# Patient Record
Sex: FEMALE | Race: WHITE | NOT HISPANIC OR LATINO | URBAN - METROPOLITAN AREA
[De-identification: names, ages, dates, MRNs, and addresses within clinical notes are randomized per-mention and may not be internally consistent; named-entity substitution may affect disease eponyms.]

---

## 2018-03-19 ENCOUNTER — OUTPATIENT (OUTPATIENT)
Dept: OUTPATIENT SERVICES | Facility: HOSPITAL | Age: 68
LOS: 1 days | End: 2018-03-19
Payer: MEDICARE

## 2018-03-19 ENCOUNTER — APPOINTMENT (OUTPATIENT)
Dept: NUCLEAR MEDICINE | Facility: HOSPITAL | Age: 68
End: 2018-03-19
Payer: MEDICARE

## 2018-03-19 DIAGNOSIS — C73 MALIGNANT NEOPLASM OF THYROID GLAND: ICD-10-CM

## 2018-03-19 PROCEDURE — 99201 OFFICE OUTPATIENT NEW 10 MINUTES: CPT

## 2018-03-20 ENCOUNTER — APPOINTMENT (OUTPATIENT)
Dept: NUCLEAR MEDICINE | Facility: HOSPITAL | Age: 68
End: 2018-03-20

## 2018-03-21 ENCOUNTER — APPOINTMENT (OUTPATIENT)
Dept: NUCLEAR MEDICINE | Facility: HOSPITAL | Age: 68
End: 2018-03-21

## 2018-03-23 ENCOUNTER — APPOINTMENT (OUTPATIENT)
Dept: NUCLEAR MEDICINE | Facility: HOSPITAL | Age: 68
End: 2018-03-23

## 2018-03-23 PROCEDURE — 78018 THYROID MET IMAGING BODY: CPT

## 2018-03-23 PROCEDURE — 78020 THYROID MET UPTAKE: CPT | Mod: 26

## 2018-03-23 PROCEDURE — 96372 THER/PROPH/DIAG INJ SC/IM: CPT

## 2018-03-23 PROCEDURE — 78020 THYROID MET UPTAKE: CPT

## 2018-03-23 PROCEDURE — 78018 THYROID MET IMAGING BODY: CPT | Mod: 26

## 2018-03-23 PROCEDURE — A9528: CPT

## 2018-06-18 ENCOUNTER — OUTPATIENT (OUTPATIENT)
Dept: OUTPATIENT SERVICES | Facility: HOSPITAL | Age: 68
LOS: 1 days | End: 2018-06-18
Payer: MEDICARE

## 2018-06-18 VITALS
SYSTOLIC BLOOD PRESSURE: 114 MMHG | HEIGHT: 65 IN | HEART RATE: 64 BPM | RESPIRATION RATE: 16 BRPM | DIASTOLIC BLOOD PRESSURE: 80 MMHG | TEMPERATURE: 99 F | WEIGHT: 166.23 LBS

## 2018-06-18 DIAGNOSIS — C73 MALIGNANT NEOPLASM OF THYROID GLAND: ICD-10-CM

## 2018-06-18 DIAGNOSIS — E89.0 POSTPROCEDURAL HYPOTHYROIDISM: Chronic | ICD-10-CM

## 2018-06-18 DIAGNOSIS — Z29.9 ENCOUNTER FOR PROPHYLACTIC MEASURES, UNSPECIFIED: ICD-10-CM

## 2018-06-18 DIAGNOSIS — Z01.818 ENCOUNTER FOR OTHER PREPROCEDURAL EXAMINATION: ICD-10-CM

## 2018-06-18 LAB
ALBUMIN SERPL ELPH-MCNC: 4.5 G/DL — SIGNIFICANT CHANGE UP (ref 3.3–5.2)
ALP SERPL-CCNC: 59 U/L — SIGNIFICANT CHANGE UP (ref 40–120)
ALT FLD-CCNC: 47 U/L — HIGH
ANION GAP SERPL CALC-SCNC: 13 MMOL/L — SIGNIFICANT CHANGE UP (ref 5–17)
APTT BLD: 30.8 SEC — SIGNIFICANT CHANGE UP (ref 27.5–37.4)
AST SERPL-CCNC: 51 U/L — HIGH
BILIRUB SERPL-MCNC: 1.8 MG/DL — SIGNIFICANT CHANGE UP (ref 0.4–2)
BLD GP AB SCN SERPL QL: SIGNIFICANT CHANGE UP
BUN SERPL-MCNC: 9 MG/DL — SIGNIFICANT CHANGE UP (ref 8–20)
CALCIUM SERPL-MCNC: 8.8 MG/DL — SIGNIFICANT CHANGE UP (ref 8.6–10.2)
CHLORIDE SERPL-SCNC: 98 MMOL/L — SIGNIFICANT CHANGE UP (ref 98–107)
CO2 SERPL-SCNC: 27 MMOL/L — SIGNIFICANT CHANGE UP (ref 22–29)
CREAT SERPL-MCNC: 0.6 MG/DL — SIGNIFICANT CHANGE UP (ref 0.5–1.3)
GLUCOSE SERPL-MCNC: 99 MG/DL — SIGNIFICANT CHANGE UP (ref 70–115)
HCT VFR BLD CALC: 39.6 % — SIGNIFICANT CHANGE UP (ref 37–47)
HGB BLD-MCNC: 12.9 G/DL — SIGNIFICANT CHANGE UP (ref 12–16)
INR BLD: 1.15 RATIO — SIGNIFICANT CHANGE UP (ref 0.88–1.16)
MCHC RBC-ENTMCNC: 32.6 G/DL — SIGNIFICANT CHANGE UP (ref 32–36)
MCHC RBC-ENTMCNC: 32.9 PG — HIGH (ref 27–31)
MCV RBC AUTO: 101 FL — HIGH (ref 81–99)
PLATELET # BLD AUTO: 243 K/UL — SIGNIFICANT CHANGE UP (ref 150–400)
POTASSIUM SERPL-MCNC: 3.8 MMOL/L — SIGNIFICANT CHANGE UP (ref 3.5–5.3)
POTASSIUM SERPL-SCNC: 3.8 MMOL/L — SIGNIFICANT CHANGE UP (ref 3.5–5.3)
PROT SERPL-MCNC: 7.6 G/DL — SIGNIFICANT CHANGE UP (ref 6.6–8.7)
PROTHROM AB SERPL-ACNC: 12.7 SEC — SIGNIFICANT CHANGE UP (ref 9.8–12.7)
RBC # BLD: 3.92 M/UL — LOW (ref 4.4–5.2)
RBC # FLD: 12.3 % — SIGNIFICANT CHANGE UP (ref 11–15.6)
SODIUM SERPL-SCNC: 138 MMOL/L — SIGNIFICANT CHANGE UP (ref 135–145)
TYPE + AB SCN PNL BLD: SIGNIFICANT CHANGE UP
WBC # BLD: 8 K/UL — SIGNIFICANT CHANGE UP (ref 4.8–10.8)
WBC # FLD AUTO: 8 K/UL — SIGNIFICANT CHANGE UP (ref 4.8–10.8)

## 2018-06-18 PROCEDURE — G0463: CPT

## 2018-06-18 PROCEDURE — 86900 BLOOD TYPING SEROLOGIC ABO: CPT

## 2018-06-18 PROCEDURE — 93010 ELECTROCARDIOGRAM REPORT: CPT

## 2018-06-18 PROCEDURE — 85730 THROMBOPLASTIN TIME PARTIAL: CPT

## 2018-06-18 PROCEDURE — 86850 RBC ANTIBODY SCREEN: CPT

## 2018-06-18 PROCEDURE — 85027 COMPLETE CBC AUTOMATED: CPT

## 2018-06-18 PROCEDURE — 36415 COLL VENOUS BLD VENIPUNCTURE: CPT

## 2018-06-18 PROCEDURE — 80053 COMPREHEN METABOLIC PANEL: CPT

## 2018-06-18 PROCEDURE — 93005 ELECTROCARDIOGRAM TRACING: CPT

## 2018-06-18 PROCEDURE — 71046 X-RAY EXAM CHEST 2 VIEWS: CPT | Mod: 26

## 2018-06-18 PROCEDURE — 85610 PROTHROMBIN TIME: CPT

## 2018-06-18 PROCEDURE — 86901 BLOOD TYPING SEROLOGIC RH(D): CPT

## 2018-06-18 PROCEDURE — 71046 X-RAY EXAM CHEST 2 VIEWS: CPT

## 2018-06-18 NOTE — H&P PST ADULT - FAMILY HISTORY
Father  Still living? No  Family history of COPD (chronic obstructive pulmonary disease), Age at diagnosis: Age Unknown     Mother  Still living? No  Family history of brain cancer, Age at diagnosis: Age Unknown

## 2018-06-18 NOTE — H&P PST ADULT - NEGATIVE SKIN SYMPTOMS
no change in size/color of mole/no pitted nails/no rash/no tumor/no brittle nails/no hair loss/no itching/no dryness

## 2018-06-18 NOTE — H&P PST ADULT - NEGATIVE CARDIOVASCULAR SYMPTOMS
no claudication/no palpitations/no dyspnea on exertion/no peripheral edema/no chest pain/no paroxysmal nocturnal dyspnea/no orthopnea

## 2018-06-18 NOTE — H&P PST ADULT - NEGATIVE GENERAL SYMPTOMS
no chills/no sweating/no anorexia/no weight gain/no polyphagia/no polyuria/no fatigue/no weight loss/no polydipsia/no fever/no malaise

## 2018-06-18 NOTE — H&P PST ADULT - NSANTHOSAYNRD_GEN_A_CORE
No. HO screening performed.  STOP BANG Legend: 0-2 = LOW Risk; 3-4 = INTERMEDIATE Risk; 5-8 = HIGH Risk

## 2018-06-18 NOTE — H&P PST ADULT - NEGATIVE FEMALE-SPECIFIC SYMPTOMS
no menorrhagia/no irregular menses/no spotting/no amenorrhea/not applicable/no vaginal discharge/no dysmenorrhea/no abnormal vaginal bleeding/no pelvic pain

## 2018-06-18 NOTE — H&P PST ADULT - NEGATIVE PSYCHIATRIC SYMPTOMS
no auditory hallucinations/no insomnia/no visual hallucinations/no memory loss/no mood swings/no agitation/no hyperactivity/no depression/no anxiety/no suicidal ideation/no paranoia

## 2018-06-18 NOTE — H&P PST ADULT - RS GEN PE MLT RESP DETAILS PC
no rhonchi/no wheezes/respirations non-labored/no chest wall tenderness/no rales/clear to auscultation bilaterally/no subcutaneous emphysema/no intercostal retractions/normal/airway patent/breath sounds equal/good air movement

## 2018-06-18 NOTE — H&P PST ADULT - NEGATIVE GASTROINTESTINAL SYMPTOMS
no steatorrhea/no nausea/no melena/no jaundice/no diarrhea/no flatulence/no vomiting/no constipation/no change in bowel habits/no abdominal pain/no hematochezia/no hiccoughs

## 2018-06-18 NOTE — H&P PST ADULT - NEGATIVE OPHTHALMOLOGIC SYMPTOMS
no pain R/no irritation R/no loss of vision L/no scleral injection L/no diplopia/no discharge R/no pain L/no scleral injection R/no lacrimation L/no lacrimation R/no blurred vision L/no blurred vision R/no irritation L/no loss of vision R/no photophobia/no discharge L

## 2018-06-18 NOTE — H&P PST ADULT - NEGATIVE ENMT SYMPTOMS
no dysphagia/no recurrent cold sores/no nasal obstruction/no post-nasal discharge/no nose bleeds/no abnormal taste sensation/no dry mouth/no nasal discharge/no vertigo/no sinus symptoms/no tinnitus/no hearing difficulty/no nasal congestion/no gum bleeding/no throat pain/no ear pain

## 2018-06-18 NOTE — H&P PST ADULT - HISTORY OF PRESENT ILLNESS
66 y/o female s/p thyroidectomy for thyroid cancer now had follow up ct of the head and neck which showed nodules patient now presents for excision of nodules left neck frozen section possible modified radical neck dissection

## 2018-06-18 NOTE — H&P PST ADULT - NEUROLOGICAL DETAILS
normal strength/responds to pain/sensation intact/cranial nerves intact/no spontaneous movement/superficial reflexes intact/alert and oriented x 3/responds to verbal commands/deep reflexes intact

## 2018-06-18 NOTE — H&P PST ADULT - GASTROINTESTINAL DETAILS
nontender/no rigidity/no organomegaly/soft/no distention/no bruit/normal/no masses palpable/no rebound tenderness/no guarding/bowel sounds normal

## 2018-06-18 NOTE — H&P PST ADULT - NEGATIVE MUSCULOSKELETAL SYMPTOMS
no myalgia/no stiffness/no neck pain/no arm pain R/no leg pain R/no muscle weakness/no back pain/no leg pain L/no muscle cramps/no arthritis/no joint swelling/no arthralgia/no arm pain L

## 2018-06-18 NOTE — H&P PST ADULT - NEGATIVE NEUROLOGICAL SYMPTOMS
no difficulty walking/no transient paralysis/no weakness/no confusion/no loss of consciousness/no hemiparesis/no tremors/no vertigo/no loss of sensation/no facial palsy/no paresthesias/no syncope/no headache/no generalized seizures/no focal seizures

## 2018-06-18 NOTE — H&P PST ADULT - ASSESSMENT
Thyroid nodule  CAPRINI SCORE    AGE RELATED RISK FACTORS                                                       MOBILITY RELATED FACTORS  [ ] Age 41-60 years                                            (1 Point)                  [ ] Bed rest                                                        (1 Point)  [x ] Age: 61-74 years                                           (2 Points)                [ ] Plaster cast                                                   (2 Points)  [ ] Age= 75 years                                              (3 Points)                 [ ] Bed bound for more than 72 hours                   (2 Points)    DISEASE RELATED RISK FACTORS                                               GENDER SPECIFIC FACTORS  [ ] Edema in the lower extremities                       (1 Point)                  [ ] Pregnancy                                                     (1 Point)  [ ] Varicose veins                                               (1 Point)                  [ ] Post-partum < 6 weeks                                   (1 Point)             [x ] BMI > 25 Kg/m2                                            (1 Point)                  [ ] Hormonal therapy  or oral contraception            (1 Point)                 [ ] Sepsis (in the previous month)                        (1 Point)                  [ ] History of pregnancy complications  [ ] Pneumonia or serious lung disease                                               [ ] Unexplained or recurrent                       (1 Point)           (in the previous month)                               (1 Point)  [ ] Abnormal pulmonary function test                     (1 Point)                 SURGERY RELATED RISK FACTORS  [ ] Acute myocardial infarction                              (1 Point)                 [ ]  Section                                            (1 Point)  [ ] Congestive heart failure (in the previous month)  (1 Point)                 [ ] Minor surgery                                                 (1 Point)   [ ] Inflammatory bowel disease                             (1 Point)                 [ ] Arthroscopic surgery                                        (2 Points)  [ ] Central venous access                                    (2 Points)                [ x] General surgery lasting more than 45 minutes   (2 Points)       [ ] Stroke (in the previous month)                          (5 Points)               [ ] Elective arthroplasty                                        (5 Points)                                                                                                                                               HEMATOLOGY RELATED FACTORS                                                 TRAUMA RELATED RISK FACTORS  [ ] Prior episodes of VTE                                     (3 Points)                 [ ] Fracture of the hip, pelvis, or leg                       (5 Points)  [ ] Positive family history for VTE                         (3 Points)                 [ ] Acute spinal cord injury (in the previous month)  (5 Points)  [ ] Prothrombin 30264 A                                      (3 Points)                 [ ] Paralysis  (less than 1 month)                          (5 Points)  [ ] Factor V Leiden                                             (3 Points)                 [ ] Multiple Trauma within 1 month                         (5 Points)  [ ] Lupus anticoagulants                                     (3 Points)                                                           [ ] Anticardiolipin antibodies                                (3 Points)                                                       [ ] High homocysteine in the blood                      (3 Points)                                             [ ] Other congenital or acquired thrombophilia       (3 Points)                                                [ ] Heparin induced thrombocytopenia                  (3 Points)                                          Total Score [  5  ]

## 2018-06-18 NOTE — H&P PST ADULT - NEGATIVE GENERAL GENITOURINARY SYMPTOMS
no incontinence/no nocturia/no urine discoloration/no bladder infections/no hematuria/no flank pain L/no flank pain R/no gas in urine/no dysuria/normal urinary frequency/no urinary hesitancy/no renal colic

## 2018-06-18 NOTE — H&P PST ADULT - MUSCULOSKELETAL
details… detailed exam no joint warmth/normal strength/no joint swelling/no joint erythema/no calf tenderness/ROM intact/normal

## 2018-06-19 ENCOUNTER — OUTPATIENT (OUTPATIENT)
Dept: OUTPATIENT SERVICES | Facility: HOSPITAL | Age: 68
LOS: 1 days | End: 2018-06-19

## 2018-06-19 DIAGNOSIS — Z01.812 ENCOUNTER FOR PREPROCEDURAL LABORATORY EXAMINATION: ICD-10-CM

## 2018-06-19 DIAGNOSIS — E89.0 POSTPROCEDURAL HYPOTHYROIDISM: Chronic | ICD-10-CM

## 2018-06-19 LAB
APPEARANCE UR: CLEAR — SIGNIFICANT CHANGE UP
BILIRUB UR-MCNC: NEGATIVE — SIGNIFICANT CHANGE UP
COLOR SPEC: YELLOW — SIGNIFICANT CHANGE UP
DIFF PNL FLD: ABNORMAL
EPI CELLS # UR: ABNORMAL
GLUCOSE UR QL: NEGATIVE MG/DL — SIGNIFICANT CHANGE UP
KETONES UR-MCNC: NEGATIVE — SIGNIFICANT CHANGE UP
LEUKOCYTE ESTERASE UR-ACNC: ABNORMAL
NITRITE UR-MCNC: NEGATIVE — SIGNIFICANT CHANGE UP
PH UR: 7 — SIGNIFICANT CHANGE UP (ref 5–8)
PROT UR-MCNC: NEGATIVE MG/DL — SIGNIFICANT CHANGE UP
SP GR SPEC: 1 — LOW (ref 1.01–1.02)
UROBILINOGEN FLD QL: NEGATIVE MG/DL — SIGNIFICANT CHANGE UP
WBC UR QL: SIGNIFICANT CHANGE UP

## 2018-06-20 LAB
THYROGLOB AB FLD IA-ACNC: <20 IU/ML — SIGNIFICANT CHANGE UP (ref 0–40)
THYROGLOB AB SERPL-ACNC: <20 IU/ML — SIGNIFICANT CHANGE UP (ref 0–40)
THYROGLOB SERPL-MCNC: 32.1 NG/ML — SIGNIFICANT CHANGE UP (ref 1.6–59.9)
THYROPEROXIDASE AB SERPL-ACNC: <10 IU/ML — SIGNIFICANT CHANGE UP (ref 0–34.9)

## 2018-06-21 ENCOUNTER — TRANSCRIPTION ENCOUNTER (OUTPATIENT)
Age: 68
End: 2018-06-21

## 2018-06-22 ENCOUNTER — RESULT REVIEW (OUTPATIENT)
Age: 68
End: 2018-06-22

## 2018-06-22 ENCOUNTER — INPATIENT (INPATIENT)
Facility: HOSPITAL | Age: 68
LOS: 0 days | Discharge: HOME | DRG: 627 | End: 2018-06-23
Attending: SPECIALIST | Admitting: SPECIALIST
Payer: MEDICARE

## 2018-06-22 VITALS
HEART RATE: 79 BPM | WEIGHT: 166.23 LBS | SYSTOLIC BLOOD PRESSURE: 147 MMHG | TEMPERATURE: 98 F | DIASTOLIC BLOOD PRESSURE: 95 MMHG | RESPIRATION RATE: 16 BRPM | OXYGEN SATURATION: 100 % | HEIGHT: 65 IN

## 2018-06-22 DIAGNOSIS — C73 MALIGNANT NEOPLASM OF THYROID GLAND: ICD-10-CM

## 2018-06-22 DIAGNOSIS — E89.0 POSTPROCEDURAL HYPOTHYROIDISM: Chronic | ICD-10-CM

## 2018-06-22 LAB — CALCIUM SERPL-MCNC: 8.6 MG/DL — SIGNIFICANT CHANGE UP (ref 8.6–10.2)

## 2018-06-22 PROCEDURE — 88341 IMHCHEM/IMCYTCHM EA ADD ANTB: CPT | Mod: 26

## 2018-06-22 PROCEDURE — 60260 REPEAT THYROID SURGERY: CPT | Mod: AS

## 2018-06-22 PROCEDURE — 88305 TISSUE EXAM BY PATHOLOGIST: CPT | Mod: 26

## 2018-06-22 PROCEDURE — 88331 PATH CONSLTJ SURG 1 BLK 1SPC: CPT | Mod: 26

## 2018-06-22 PROCEDURE — 38542 EXPLORE DEEP NODE(S) NECK: CPT | Mod: AS,59

## 2018-06-22 PROCEDURE — 60502 RE-EXPLORE PARATHYROIDS: CPT | Mod: AS,79

## 2018-06-22 PROCEDURE — 88342 IMHCHEM/IMCYTCHM 1ST ANTB: CPT | Mod: 26

## 2018-06-22 RX ORDER — OXYCODONE AND ACETAMINOPHEN 5; 325 MG/1; MG/1
1 TABLET ORAL EVERY 4 HOURS
Qty: 0 | Refills: 0 | Status: DISCONTINUED | OUTPATIENT
Start: 2018-06-22 | End: 2018-06-23

## 2018-06-22 RX ORDER — LEVOTHYROXINE SODIUM 125 MCG
100 TABLET ORAL DAILY
Qty: 0 | Refills: 0 | Status: DISCONTINUED | OUTPATIENT
Start: 2018-06-22 | End: 2018-06-23

## 2018-06-22 RX ORDER — SODIUM CHLORIDE 9 MG/ML
3 INJECTION INTRAMUSCULAR; INTRAVENOUS; SUBCUTANEOUS ONCE
Qty: 0 | Refills: 0 | Status: COMPLETED | OUTPATIENT
Start: 2018-06-22 | End: 2018-06-22

## 2018-06-22 RX ORDER — HYDROCHLOROTHIAZIDE 25 MG
12.5 TABLET ORAL DAILY
Qty: 0 | Refills: 0 | Status: DISCONTINUED | OUTPATIENT
Start: 2018-06-22 | End: 2018-06-23

## 2018-06-22 RX ORDER — HYDROMORPHONE HYDROCHLORIDE 2 MG/ML
0.5 INJECTION INTRAMUSCULAR; INTRAVENOUS; SUBCUTANEOUS
Qty: 0 | Refills: 0 | Status: DISCONTINUED | OUTPATIENT
Start: 2018-06-22 | End: 2018-06-22

## 2018-06-22 RX ORDER — LOSARTAN POTASSIUM 100 MG/1
100 TABLET, FILM COATED ORAL DAILY
Qty: 0 | Refills: 0 | Status: DISCONTINUED | OUTPATIENT
Start: 2018-06-22 | End: 2018-06-23

## 2018-06-22 RX ORDER — ATORVASTATIN CALCIUM 80 MG/1
20 TABLET, FILM COATED ORAL AT BEDTIME
Qty: 0 | Refills: 0 | Status: DISCONTINUED | OUTPATIENT
Start: 2018-06-22 | End: 2018-06-23

## 2018-06-22 RX ORDER — ONDANSETRON 8 MG/1
4 TABLET, FILM COATED ORAL ONCE
Qty: 0 | Refills: 0 | Status: DISCONTINUED | OUTPATIENT
Start: 2018-06-22 | End: 2018-06-22

## 2018-06-22 RX ORDER — CEFAZOLIN SODIUM 1 G
2000 VIAL (EA) INJECTION ONCE
Qty: 0 | Refills: 0 | Status: COMPLETED | OUTPATIENT
Start: 2018-06-22 | End: 2018-06-22

## 2018-06-22 RX ORDER — FENTANYL CITRATE 50 UG/ML
25 INJECTION INTRAVENOUS
Qty: 0 | Refills: 0 | Status: DISCONTINUED | OUTPATIENT
Start: 2018-06-22 | End: 2018-06-22

## 2018-06-22 RX ADMIN — HYDROMORPHONE HYDROCHLORIDE 0.5 MILLIGRAM(S): 2 INJECTION INTRAMUSCULAR; INTRAVENOUS; SUBCUTANEOUS at 18:15

## 2018-06-22 RX ADMIN — HYDROMORPHONE HYDROCHLORIDE 0.5 MILLIGRAM(S): 2 INJECTION INTRAMUSCULAR; INTRAVENOUS; SUBCUTANEOUS at 18:00

## 2018-06-22 RX ADMIN — OXYCODONE AND ACETAMINOPHEN 1 TABLET(S): 5; 325 TABLET ORAL at 21:30

## 2018-06-22 RX ADMIN — OXYCODONE AND ACETAMINOPHEN 1 TABLET(S): 5; 325 TABLET ORAL at 21:56

## 2018-06-22 RX ADMIN — Medication 100 MILLIGRAM(S): at 14:50

## 2018-06-22 RX ADMIN — SODIUM CHLORIDE 3 MILLILITER(S): 9 INJECTION INTRAMUSCULAR; INTRAVENOUS; SUBCUTANEOUS at 21:20

## 2018-06-22 RX ADMIN — ATORVASTATIN CALCIUM 20 MILLIGRAM(S): 80 TABLET, FILM COATED ORAL at 21:56

## 2018-06-22 RX ADMIN — HYDROMORPHONE HYDROCHLORIDE 0.5 MILLIGRAM(S): 2 INJECTION INTRAMUSCULAR; INTRAVENOUS; SUBCUTANEOUS at 18:01

## 2018-06-22 RX ADMIN — HYDROMORPHONE HYDROCHLORIDE 0.5 MILLIGRAM(S): 2 INJECTION INTRAMUSCULAR; INTRAVENOUS; SUBCUTANEOUS at 18:30

## 2018-06-22 NOTE — BRIEF OPERATIVE NOTE - OPERATION/FINDINGS
L neck exploration  excision nodules L thyroid bed  excision LN jugular medial  excision LN level 3 jugular lateral  IONM  PC

## 2018-06-22 NOTE — PROGRESS NOTE ADULT - SUBJECTIVE AND OBJECTIVE BOX
POD#0 s/p L neck exploration, excision nodules L thyroid bed, excision LN jugular medial, excision LN level 3 jugular lateral, IONM, PC    Pt denies complaints, but is requesting pain meds.   Denies difficulty w/swallowing, speaking, breathing.     VSS, NAD, A&Ox3  Neck : surgical towel around neck, dsg cdi , no palpable hematoma   CVS: S1, S2  Chest: BS BL   Abd: NT   Ext: no CT       post op CA = 8.6

## 2018-06-23 ENCOUNTER — TRANSCRIPTION ENCOUNTER (OUTPATIENT)
Age: 68
End: 2018-06-23

## 2018-06-23 VITALS — TEMPERATURE: 99 F

## 2018-06-23 DIAGNOSIS — Z85.850 PERSONAL HISTORY OF MALIGNANT NEOPLASM OF THYROID: ICD-10-CM

## 2018-06-23 LAB
ANION GAP SERPL CALC-SCNC: 13 MMOL/L — SIGNIFICANT CHANGE UP (ref 5–17)
BUN SERPL-MCNC: 11 MG/DL — SIGNIFICANT CHANGE UP (ref 8–20)
CALCIUM SERPL-MCNC: 8.4 MG/DL — LOW (ref 8.6–10.2)
CHLORIDE SERPL-SCNC: 100 MMOL/L — SIGNIFICANT CHANGE UP (ref 98–107)
CO2 SERPL-SCNC: 25 MMOL/L — SIGNIFICANT CHANGE UP (ref 22–29)
CREAT SERPL-MCNC: 0.57 MG/DL — SIGNIFICANT CHANGE UP (ref 0.5–1.3)
GLUCOSE SERPL-MCNC: 114 MG/DL — SIGNIFICANT CHANGE UP (ref 70–115)
HCT VFR BLD CALC: 34.9 % — LOW (ref 37–47)
HGB BLD-MCNC: 11.7 G/DL — LOW (ref 12–16)
MCHC RBC-ENTMCNC: 33.5 G/DL — SIGNIFICANT CHANGE UP (ref 32–36)
MCHC RBC-ENTMCNC: 34.4 PG — HIGH (ref 27–31)
MCV RBC AUTO: 102.6 FL — HIGH (ref 81–99)
PLATELET # BLD AUTO: 213 K/UL — SIGNIFICANT CHANGE UP (ref 150–400)
POTASSIUM SERPL-MCNC: 3.6 MMOL/L — SIGNIFICANT CHANGE UP (ref 3.5–5.3)
POTASSIUM SERPL-SCNC: 3.6 MMOL/L — SIGNIFICANT CHANGE UP (ref 3.5–5.3)
RBC # BLD: 3.4 M/UL — LOW (ref 4.4–5.2)
RBC # FLD: 11.9 % — SIGNIFICANT CHANGE UP (ref 11–15.6)
SODIUM SERPL-SCNC: 138 MMOL/L — SIGNIFICANT CHANGE UP (ref 135–145)
WBC # BLD: 8.9 K/UL — SIGNIFICANT CHANGE UP (ref 4.8–10.8)
WBC # FLD AUTO: 8.9 K/UL — SIGNIFICANT CHANGE UP (ref 4.8–10.8)

## 2018-06-23 PROCEDURE — 88331 PATH CONSLTJ SURG 1 BLK 1SPC: CPT

## 2018-06-23 PROCEDURE — 81001 URINALYSIS AUTO W/SCOPE: CPT

## 2018-06-23 PROCEDURE — 88305 TISSUE EXAM BY PATHOLOGIST: CPT

## 2018-06-23 PROCEDURE — 84432 ASSAY OF THYROGLOBULIN: CPT

## 2018-06-23 PROCEDURE — 88341 IMHCHEM/IMCYTCHM EA ADD ANTB: CPT

## 2018-06-23 PROCEDURE — 88342 IMHCHEM/IMCYTCHM 1ST ANTB: CPT

## 2018-06-23 PROCEDURE — 86900 BLOOD TYPING SEROLOGIC ABO: CPT

## 2018-06-23 PROCEDURE — 86800 THYROGLOBULIN ANTIBODY: CPT

## 2018-06-23 PROCEDURE — 36415 COLL VENOUS BLD VENIPUNCTURE: CPT

## 2018-06-23 PROCEDURE — 86901 BLOOD TYPING SEROLOGIC RH(D): CPT

## 2018-06-23 PROCEDURE — 82310 ASSAY OF CALCIUM: CPT

## 2018-06-23 PROCEDURE — 85027 COMPLETE CBC AUTOMATED: CPT

## 2018-06-23 PROCEDURE — 80048 BASIC METABOLIC PNL TOTAL CA: CPT

## 2018-06-23 RX ORDER — LOSARTAN/HYDROCHLOROTHIAZIDE 100MG-25MG
1 TABLET ORAL
Qty: 0 | Refills: 0 | COMMUNITY

## 2018-06-23 RX ADMIN — OXYCODONE AND ACETAMINOPHEN 1 TABLET(S): 5; 325 TABLET ORAL at 04:30

## 2018-06-23 RX ADMIN — OXYCODONE AND ACETAMINOPHEN 1 TABLET(S): 5; 325 TABLET ORAL at 03:53

## 2018-06-23 RX ADMIN — OXYCODONE AND ACETAMINOPHEN 1 TABLET(S): 5; 325 TABLET ORAL at 11:15

## 2018-06-23 RX ADMIN — Medication 100 MICROGRAM(S): at 06:13

## 2018-06-23 RX ADMIN — OXYCODONE AND ACETAMINOPHEN 1 TABLET(S): 5; 325 TABLET ORAL at 10:37

## 2018-06-23 NOTE — DISCHARGE NOTE ADULT - CONDITIONS AT DISCHARGE
Pt remained hemodynamically stable. Pt denied chest pain and shortness of breath. Pt remained safe, calm, and comfortable. Pt incision clean, dry, and intact. No discharge or drainage at site. Vital signs stable. Patient ready for discharge.

## 2018-06-23 NOTE — DISCHARGE NOTE ADULT - NS AS ACTIVITY OBS
Walking-Outdoors allowed/Stairs allowed/Do not drive or operate machinery/Do not make important decisions/Walking-Indoors allowed/No Heavy lifting/straining

## 2018-06-23 NOTE — DISCHARGE NOTE ADULT - MEDICATION SUMMARY - MEDICATIONS TO TAKE
I will START or STAY ON the medications listed below when I get home from the hospital:    oxyCODONE-acetaminophen 5 mg-325 mg oral tablet  -- 1 tab(s) by mouth every 6 hours, As Needed -Moderate Pain (4 - 6) MDD:4 tabs  -- Indication: For pain    Lipitor 20 mg oral tablet  -- 1 tab(s) by mouth once a day (at bedtime)  -- Indication: For Home med    losartan-hydrochlorothiazide 100mg-12.5mg oral tablet  -- 1 tab(s) by mouth once a day  -- Indication: For Home med    Synthroid 100 mcg (0.1 mg) oral tablet  -- 1 tab(s) by mouth once a day  -- Indication: For Home med

## 2018-06-23 NOTE — DISCHARGE NOTE ADULT - CARE PROVIDER_API CALL
Ney Barajas), Surgery  [address]  [address]  [place] [address]  Phone: [phone]  Fax: [phone]

## 2018-06-23 NOTE — DISCHARGE NOTE ADULT - PATIENT PORTAL LINK FT
You can access the [facility], offered by [facility], by registering with the following website: [email or web address]

## 2018-06-23 NOTE — DISCHARGE NOTE ADULT - CARE PLAN
Principal Discharge DX:	History of thyroid cancer  Goal:	resume diet and activities of daily living  Assessment and plan of treatment:	stable, follow up with Dr Barajas  6/28

## 2018-06-23 NOTE — PROGRESS NOTE ADULT - SUBJECTIVE AND OBJECTIVE BOX
SURGICAL PA NOTE:     STATUS POST:      Diagnosis:    Pre-Op Diagnosis:  Recurrent thyroid cancer  06/22/2018    Active  Ney Barajas.     Post-Op Dx:  Recurrent thyroid cancer  06/22/2018    Active  Ney Barajas.       Operative Findings:  · Operative Findings	L neck exploration excision nodules L thyroid bed excision LN jugular medial excision LN level 3 jugular lateral IONM PC	    Specimens/Blood Loss/IV/Output/Protocol/VTE:    Specimens/Blood Loss/IV/Output/Protocol/VTE:  · Specimens	3	  · Estimated Blood Loss	10 milliLiter(s)	      POST OPERATIVE DAY #: 1    Vital Signs Last 24 Hrs  T(C): 36.5 (23 Jun 2018 04:00), Max: 37.1 (22 Jun 2018 21:00)  T(F): 97.7 (23 Jun 2018 04:00), Max: 98.7 (22 Jun 2018 21:00)  HR: 52 (23 Jun 2018 06:00) (52 - 656)  BP: 104/57 (23 Jun 2018 06:00) (89/55 - 147/95)  BP(mean): 75 (23 Jun 2018 06:00) (66 - 95)  RR: 15 (23 Jun 2018 06:00) (10 - 28)  SpO2: 92% (23 Jun 2018 06:00) (91% - 100%)    HPI:      Malignant neoplasm of thyroid gland  Family history of brain cancer  Family history of COPD (chronic obstructive pulmonary disease)  Handoff  Hypothyroid  History of thyroid cancer  H/O heartburn  Hyperlipidemia  Hypertension  Recurrent thyroid cancer  Recurrent thyroid cancer  H/O thyroidectomy      SUBJECTIVE: Pt seen lying supine with HOB, no c/o SOB/dyspnea, good voice projection, barbara po liquids ok, no difficulty swallowing, c/o mild incisional pain    Diet: clears to reg today    Activity: OOB    Fevers: [ ]Yes [x ]NO  Chills: [ ] Yes [x ] NO  SOB:  [ ] YES [x ] NO  Dyspnea: [ ]YES [ x]NO  Chest Discomfort: [ ] YES [x ] NO    Nausea: [ ] YES [x ] NO           Vomiting: [ ] YES [ x] NO  Flatus: [ x] YES [ ] NO             Bowel Movement: [ ] YES [ ] NO  Diarrhea: [ ] YES [ ] NO         Void: [x ]YES [ ]No  Constipation: [ ] YES [x ] NO       Pain (0-10):              Pain Control Adequate: [x ] YES [ ] NO    Root:    NGT:      I&O's Detail    22 Jun 2018 07:01  -  23 Jun 2018 07:00  --------------------------------------------------------  IN:    Oral Fluid: 600 mL  Total IN: 600 mL    OUT:    Bulb: 60 mL    Voided: 400 mL  Total OUT: 460 mL    Total NET: 140 mL        I&O's Summary    22 Jun 2018 07:01  -  23 Jun 2018 07:00  --------------------------------------------------------  IN: 600 mL / OUT: 460 mL / NET: 140 mL          MEDICATIONS  (STANDING):  atorvastatin 20 milliGRAM(s) Oral at bedtime  hydrochlorothiazide 12.5 milliGRAM(s) Oral daily  levothyroxine 100 MICROGram(s) Oral daily  losartan 100 milliGRAM(s) Oral daily    MEDICATIONS  (PRN):  oxyCODONE    5 mG/acetaminophen 325 mG 1 Tablet(s) Oral every 4 hours PRN Moderate Pain (4 - 6)      LABS:                        11.7   8.9   )-----------( 213      ( 23 Jun 2018 04:58 )             34.9     06-23    138  |  100  |  11.0  ----------------------------<  114  3.6   |  25.0  |  0.57    Ca    8.4<L>      23 Jun 2018 04:58              RADIOLOGY & ADDITIONAL STUDIES: SURGICAL PA NOTE:     STATUS POST:      Diagnosis:    Pre-Op Diagnosis:  Recurrent thyroid cancer  06/22/2018    Active  Ney Barajas.     Post-Op Dx:  Recurrent thyroid cancer  06/22/2018    Active  Ney Barajas.       Operative Findings:  · Operative Findings	L neck exploration excision nodules L thyroid bed excision LN jugular medial excision LN level 3 jugular lateral IONM PC	    Specimens/Blood Loss/IV/Output/Protocol/VTE:    Specimens/Blood Loss/IV/Output/Protocol/VTE:  · Specimens	3	  · Estimated Blood Loss	10 milliLiter(s)	      POST OPERATIVE DAY #: 1    Vital Signs Last 24 Hrs  T(C): 36.5 (23 Jun 2018 04:00), Max: 37.1 (22 Jun 2018 21:00)  T(F): 97.7 (23 Jun 2018 04:00), Max: 98.7 (22 Jun 2018 21:00)  HR: 52 (23 Jun 2018 06:00) (52 - 656)  BP: 104/57 (23 Jun 2018 06:00) (89/55 - 147/95)  BP(mean): 75 (23 Jun 2018 06:00) (66 - 95)  RR: 15 (23 Jun 2018 06:00) (10 - 28)  SpO2: 92% (23 Jun 2018 06:00) (91% - 100%)    HPI:      Malignant neoplasm of thyroid gland  Family history of brain cancer  Family history of COPD (chronic obstructive pulmonary disease)  Handoff  Hypothyroid  History of thyroid cancer  H/O heartburn  Hyperlipidemia  Hypertension  Recurrent thyroid cancer  Recurrent thyroid cancer  H/O thyroidectomy      SUBJECTIVE: Pt seen lying supine with HOB, no c/o SOB/dyspnea, good voice projection, barbara po liquids ok, no difficulty swallowing, c/o mild incisional pain    Diet: clears to reg today    Activity: OOB    Fevers: [ ]Yes [x ]NO  Chills: [ ] Yes [x ] NO  SOB:  [ ] YES [x ] NO  Dyspnea: [ ]YES [ x]NO  Chest Discomfort: [ ] YES [x ] NO    Nausea: [ ] YES [x ] NO           Vomiting: [ ] YES [ x] NO  Flatus: [ x] YES [ ] NO             Bowel Movement: [ ] YES [ ] NO  Diarrhea: [ ] YES [ ] NO         Void: [x ]YES [ ]No  Constipation: [ ] YES [x ] NO       Pain (0-10):              Pain Control Adequate: [x ] YES [ ] NO    Root:    NGT:      I&O's Detail    22 Jun 2018 07:01  -  23 Jun 2018 07:00  --------------------------------------------------------  IN:    Oral Fluid: 600 mL  Total IN: 600 mL    OUT:    Bulb: 30 mL    Voided: 400 mL  Total OUT: 460 mL    Total NET: 140 mL        I&O's Summary    22 Jun 2018 07:01  -  23 Jun 2018 07:00  --------------------------------------------------------  IN: 600 mL / OUT: 460 mL / NET: 140 mL          MEDICATIONS  (STANDING):  atorvastatin 20 milliGRAM(s) Oral at bedtime  hydrochlorothiazide 12.5 milliGRAM(s) Oral daily  levothyroxine 100 MICROGram(s) Oral daily  losartan 100 milliGRAM(s) Oral daily    MEDICATIONS  (PRN):  oxyCODONE    5 mG/acetaminophen 325 mG 1 Tablet(s) Oral every 4 hours PRN Moderate Pain (4 - 6)      LABS:                        11.7   8.9   )-----------( 213      ( 23 Jun 2018 04:58 )             34.9     06-23    138  |  100  |  11.0  ----------------------------<  114  3.6   |  25.0  |  0.57    Ca    8.4<L>      23 Jun 2018 04:58              RADIOLOGY & ADDITIONAL STUDIES:

## 2018-06-23 NOTE — DISCHARGE NOTE ADULT - HOSPITAL COURSE
68 y/o female s/p thyroidectomy for thyroid cancer now had follow up ct of the head and neck which showed nodules. Patient now presents for excision of nodules left neck frozen section on 6/22 by Dr Barajas.    Pt is POD #1: good vocie projection, barbara po well, no difficulty swallowing, no SOB/dyspnea, no stridor    NICK removed.    Pt to be discharged for outpt follow up with Dr Barajas on 6/28.

## 2018-07-03 LAB — SURGICAL PATHOLOGY FINAL REPORT - CH: SIGNIFICANT CHANGE UP

## 2018-07-10 ENCOUNTER — OUTPATIENT (OUTPATIENT)
Dept: OUTPATIENT SERVICES | Facility: HOSPITAL | Age: 68
LOS: 1 days | End: 2018-07-10
Payer: MEDICARE

## 2018-07-10 DIAGNOSIS — E89.0 POSTPROCEDURAL HYPOTHYROIDISM: Chronic | ICD-10-CM

## 2018-07-10 DIAGNOSIS — E07.9 DISORDER OF THYROID, UNSPECIFIED: ICD-10-CM

## 2018-07-10 LAB
CALCIUM SERPL-MCNC: 9.3 MG/DL — SIGNIFICANT CHANGE UP (ref 8.6–10.2)
T3 SERPL-MCNC: 101 NG/DL — SIGNIFICANT CHANGE UP (ref 80–200)
T4 AB SER-ACNC: 9.3 UG/DL — SIGNIFICANT CHANGE UP (ref 4.5–12)
THYROGLOB AB FLD IA-ACNC: <20 IU/ML — SIGNIFICANT CHANGE UP (ref 0–40)
THYROGLOB AB FLD IA-ACNC: <20 IU/ML — SIGNIFICANT CHANGE UP (ref 0–40)
THYROGLOB AB SERPL-ACNC: <20 IU/ML — SIGNIFICANT CHANGE UP (ref 0–40)
THYROGLOB AB SERPL-ACNC: <20 IU/ML — SIGNIFICANT CHANGE UP (ref 0–40)
THYROGLOB SERPL-MCNC: 30.1 NG/ML — SIGNIFICANT CHANGE UP (ref 1.6–59.9)
TSH SERPL-MCNC: 0.38 UIU/ML — SIGNIFICANT CHANGE UP (ref 0.27–4.2)

## 2018-07-10 PROCEDURE — 82310 ASSAY OF CALCIUM: CPT

## 2018-07-10 PROCEDURE — 86800 THYROGLOBULIN ANTIBODY: CPT

## 2018-07-10 PROCEDURE — 84432 ASSAY OF THYROGLOBULIN: CPT

## 2018-07-10 PROCEDURE — 84443 ASSAY THYROID STIM HORMONE: CPT

## 2018-07-10 PROCEDURE — 84436 ASSAY OF TOTAL THYROXINE: CPT

## 2018-07-10 PROCEDURE — 84480 ASSAY TRIIODOTHYRONINE (T3): CPT

## 2018-07-18 PROBLEM — E03.9 HYPOTHYROIDISM, UNSPECIFIED: Chronic | Status: ACTIVE | Noted: 2018-06-18

## 2018-07-18 PROBLEM — Z87.898 PERSONAL HISTORY OF OTHER SPECIFIED CONDITIONS: Chronic | Status: ACTIVE | Noted: 2018-06-18

## 2018-07-18 PROBLEM — Z85.850 PERSONAL HISTORY OF MALIGNANT NEOPLASM OF THYROID: Chronic | Status: ACTIVE | Noted: 2018-06-18

## 2018-07-20 ENCOUNTER — OUTPATIENT (OUTPATIENT)
Dept: OUTPATIENT SERVICES | Facility: HOSPITAL | Age: 68
LOS: 1 days | End: 2018-07-20
Payer: MEDICARE

## 2018-07-20 DIAGNOSIS — E89.0 POSTPROCEDURAL HYPOTHYROIDISM: Chronic | ICD-10-CM

## 2018-07-20 DIAGNOSIS — Z00.8 ENCOUNTER FOR OTHER GENERAL EXAMINATION: ICD-10-CM

## 2018-07-20 LAB
T3 SERPL-MCNC: 100 NG/DL — SIGNIFICANT CHANGE UP (ref 80–200)
T4 AB SER-ACNC: 8.8 UG/DL — SIGNIFICANT CHANGE UP (ref 4.5–12)
TSH SERPL-MCNC: 0.18 UIU/ML — LOW (ref 0.27–4.2)

## 2018-07-20 PROCEDURE — 84480 ASSAY TRIIODOTHYRONINE (T3): CPT

## 2018-07-20 PROCEDURE — 84443 ASSAY THYROID STIM HORMONE: CPT

## 2018-07-20 PROCEDURE — 84436 ASSAY OF TOTAL THYROXINE: CPT

## 2018-07-20 PROCEDURE — 36415 COLL VENOUS BLD VENIPUNCTURE: CPT

## 2018-08-09 ENCOUNTER — INPATIENT (INPATIENT)
Facility: HOSPITAL | Age: 68
LOS: 3 days | Discharge: HOME | DRG: 204 | End: 2018-08-13
Attending: INTERNAL MEDICINE | Admitting: HOSPITALIST
Payer: MEDICARE

## 2018-08-09 ENCOUNTER — APPOINTMENT (OUTPATIENT)
Dept: PULMONOLOGY | Facility: CLINIC | Age: 68
End: 2018-08-09
Payer: MEDICARE

## 2018-08-09 VITALS
HEIGHT: 64 IN | DIASTOLIC BLOOD PRESSURE: 60 MMHG | OXYGEN SATURATION: 99 % | BODY MASS INDEX: 27.31 KG/M2 | SYSTOLIC BLOOD PRESSURE: 126 MMHG | HEART RATE: 82 BPM | WEIGHT: 160 LBS

## 2018-08-09 VITALS — WEIGHT: 158.07 LBS | HEIGHT: 65 IN

## 2018-08-09 DIAGNOSIS — E89.0 POSTPROCEDURAL HYPOTHYROIDISM: Chronic | ICD-10-CM

## 2018-08-09 DIAGNOSIS — Z82.5 FAMILY HISTORY OF ASTHMA AND OTHER CHRONIC LOWER RESPIRATORY DISEASES: ICD-10-CM

## 2018-08-09 DIAGNOSIS — Z86.39 PERSONAL HISTORY OF OTHER ENDOCRINE, NUTRITIONAL AND METABOLIC DISEASE: ICD-10-CM

## 2018-08-09 DIAGNOSIS — Z86.79 PERSONAL HISTORY OF OTHER DISEASES OF THE CIRCULATORY SYSTEM: ICD-10-CM

## 2018-08-09 DIAGNOSIS — R06.09 OTHER FORMS OF DYSPNEA: ICD-10-CM

## 2018-08-09 LAB
ALBUMIN SERPL ELPH-MCNC: 4.2 G/DL — SIGNIFICANT CHANGE UP (ref 3.3–5.2)
ALP SERPL-CCNC: 54 U/L — SIGNIFICANT CHANGE UP (ref 40–120)
ALT FLD-CCNC: 34 U/L — HIGH
ANION GAP SERPL CALC-SCNC: 15 MMOL/L — SIGNIFICANT CHANGE UP (ref 5–17)
AST SERPL-CCNC: 46 U/L — HIGH
BASOPHILS # BLD AUTO: 0 K/UL — SIGNIFICANT CHANGE UP (ref 0–0.2)
BASOPHILS NFR BLD AUTO: 0.4 % — SIGNIFICANT CHANGE UP (ref 0–2)
BILIRUB SERPL-MCNC: 1.2 MG/DL — SIGNIFICANT CHANGE UP (ref 0.4–2)
BUN SERPL-MCNC: 9 MG/DL — SIGNIFICANT CHANGE UP (ref 8–20)
CALCIUM SERPL-MCNC: 9.2 MG/DL — SIGNIFICANT CHANGE UP (ref 8.6–10.2)
CHLORIDE SERPL-SCNC: 99 MMOL/L — SIGNIFICANT CHANGE UP (ref 98–107)
CK SERPL-CCNC: 100 U/L — SIGNIFICANT CHANGE UP (ref 25–170)
CO2 SERPL-SCNC: 24 MMOL/L — SIGNIFICANT CHANGE UP (ref 22–29)
CREAT SERPL-MCNC: 0.5 MG/DL — SIGNIFICANT CHANGE UP (ref 0.5–1.3)
EOSINOPHIL # BLD AUTO: 0.1 K/UL — SIGNIFICANT CHANGE UP (ref 0–0.5)
EOSINOPHIL NFR BLD AUTO: 1.6 % — SIGNIFICANT CHANGE UP (ref 0–6)
GLUCOSE SERPL-MCNC: 92 MG/DL — SIGNIFICANT CHANGE UP (ref 70–115)
HCT VFR BLD CALC: 38.1 % — SIGNIFICANT CHANGE UP (ref 37–47)
HGB BLD-MCNC: 12.8 G/DL — SIGNIFICANT CHANGE UP (ref 12–16)
INR BLD: 1.09 RATIO — SIGNIFICANT CHANGE UP (ref 0.88–1.16)
LIDOCAIN IGE QN: 48 U/L — SIGNIFICANT CHANGE UP (ref 22–51)
LYMPHOCYTES # BLD AUTO: 2.5 K/UL — SIGNIFICANT CHANGE UP (ref 1–4.8)
LYMPHOCYTES # BLD AUTO: 36.8 % — SIGNIFICANT CHANGE UP (ref 20–55)
MCHC RBC-ENTMCNC: 32.8 PG — HIGH (ref 27–31)
MCHC RBC-ENTMCNC: 33.6 G/DL — SIGNIFICANT CHANGE UP (ref 32–36)
MCV RBC AUTO: 97.7 FL — SIGNIFICANT CHANGE UP (ref 81–99)
MONOCYTES # BLD AUTO: 0.6 K/UL — SIGNIFICANT CHANGE UP (ref 0–0.8)
MONOCYTES NFR BLD AUTO: 8.7 % — SIGNIFICANT CHANGE UP (ref 3–10)
NEUTROPHILS # BLD AUTO: 3.5 K/UL — SIGNIFICANT CHANGE UP (ref 1.8–8)
NEUTROPHILS NFR BLD AUTO: 52.4 % — SIGNIFICANT CHANGE UP (ref 37–73)
NT-PROBNP SERPL-SCNC: 974 PG/ML — HIGH (ref 0–300)
PLATELET # BLD AUTO: 232 K/UL — SIGNIFICANT CHANGE UP (ref 150–400)
POTASSIUM SERPL-MCNC: 3.8 MMOL/L — SIGNIFICANT CHANGE UP (ref 3.5–5.3)
POTASSIUM SERPL-SCNC: 3.8 MMOL/L — SIGNIFICANT CHANGE UP (ref 3.5–5.3)
PROT SERPL-MCNC: 7.4 G/DL — SIGNIFICANT CHANGE UP (ref 6.6–8.7)
PROTHROM AB SERPL-ACNC: 12 SEC — SIGNIFICANT CHANGE UP (ref 9.8–12.7)
RBC # BLD: 3.9 M/UL — LOW (ref 4.4–5.2)
RBC # FLD: 12.4 % — SIGNIFICANT CHANGE UP (ref 11–15.6)
SODIUM SERPL-SCNC: 138 MMOL/L — SIGNIFICANT CHANGE UP (ref 135–145)
TROPONIN T SERPL-MCNC: <0.01 NG/ML — SIGNIFICANT CHANGE UP (ref 0–0.06)
WBC # BLD: 6.7 K/UL — SIGNIFICANT CHANGE UP (ref 4.8–10.8)
WBC # FLD AUTO: 6.7 K/UL — SIGNIFICANT CHANGE UP (ref 4.8–10.8)

## 2018-08-09 PROCEDURE — 71046 X-RAY EXAM CHEST 2 VIEWS: CPT | Mod: 26

## 2018-08-09 PROCEDURE — 94010 BREATHING CAPACITY TEST: CPT

## 2018-08-09 PROCEDURE — 99205 OFFICE O/P NEW HI 60 MIN: CPT | Mod: 25

## 2018-08-09 PROCEDURE — 93010 ELECTROCARDIOGRAM REPORT: CPT

## 2018-08-09 PROCEDURE — 99285 EMERGENCY DEPT VISIT HI MDM: CPT | Mod: GC

## 2018-08-09 RX ORDER — LOVASTATIN 20 MG/1
20 TABLET ORAL
Refills: 0 | Status: ACTIVE | COMMUNITY

## 2018-08-09 RX ORDER — LEVOTHYROXINE SODIUM 100 UG/1
100 TABLET ORAL
Refills: 0 | Status: ACTIVE | COMMUNITY

## 2018-08-09 RX ORDER — SODIUM CHLORIDE 9 MG/ML
3 INJECTION INTRAMUSCULAR; INTRAVENOUS; SUBCUTANEOUS ONCE
Qty: 0 | Refills: 0 | Status: COMPLETED | OUTPATIENT
Start: 2018-08-09 | End: 2018-08-09

## 2018-08-09 RX ADMIN — SODIUM CHLORIDE 3 MILLILITER(S): 9 INJECTION INTRAMUSCULAR; INTRAVENOUS; SUBCUTANEOUS at 22:52

## 2018-08-09 NOTE — ED PROVIDER NOTE - PHYSICAL EXAMINATION
General: NAD, no JVD  Neurology: A&Ox3, nonfocal, REGALADO x 4  Respiratory: CTA B/L  CV: RRR, S1S2, no murmurs, rubs or gallops  Abdominal: Soft, NT, ND +BS, Last BM  Extremities: No edema, + peripheral pulses, no calf pain or tenderness  Incisions: transverse 6 cm Left neck incision

## 2018-08-09 NOTE — ED PROVIDER NOTE - ATTENDING CONTRIBUTION TO CARE
69 yo female presents for several weeks of worsening dyspnea on exertion. I personally saw the patient with the Resident, and completed the key components of the history and physical exam. I then discussed the management plan with the PA.

## 2018-08-09 NOTE — ED PROVIDER NOTE - FAMILY HISTORY
Family history of COPD (chronic obstructive pulmonary disease)     Family history of brain cancer     Father  Still living? Unknown  Family history of hypertension, Age at diagnosis: Age Unknown  Family history of hyperlipidemia, Age at diagnosis: Age Unknown     Mother  Still living? Unknown  Family history of hypertension, Age at diagnosis: Age Unknown  Family history of hyperlipidemia, Age at diagnosis: Age Unknown

## 2018-08-09 NOTE — ED PROVIDER NOTE - CHPI ED SYMPTOMS NEG
no wheezing/no diaphoresis/no headache/no hemoptysis/no edema/no body aches/no chest pain/no fever/no chills

## 2018-08-09 NOTE — ED ADULT NURSE NOTE - OBJECTIVE STATEMENT
PT c/o diff breathing for couple weeks, dyspnea on exertion, had cancerous lymph nodes removed from left side of neck weeks ago w/out complications, denies pain, denies CP, sat @ 100% on room air, states "I was fine until I was getting short of breath when I walk", denies hx of asthma/COPD, no hx of PE or DVT, denies leg pain or swelling

## 2018-08-09 NOTE — ED ADULT NURSE NOTE - NSIMPLEMENTINTERV_GEN_ALL_ED
Implemented All Universal Safety Interventions:  [place] to call system. Call bell, personal items and telephone within reach. Instruct patient to call for assistance. Room bathroom lighting operational. Non-slip footwear when patient is off stretcher. Physically safe environment: no spills, clutter or unnecessary equipment. Stretcher in lowest position, wheels locked, appropriate side rails in place.

## 2018-08-09 NOTE — ED PROVIDER NOTE - OBJECTIVE STATEMENT
Patient is a 67 y/o female with a PMHx of HTN, HLD, thyroid ca s/p thyroidectomy 9 years ago and recurrence s/p removal of 3 LN from neck on June 22, 2018. She was sent by Dr. Rogers for evaluation to r/o PE due to recent hx of surgery in the neck. Patient states that she remained active after surgery and that 2 weeks after surgery she began to feel short of breath that began gradually and has since become worse. She attributed it to the weather at first but states that it is different from her baseline. She states she can only walk 1 block before she feels short of breath now, but was previously able to walk longer distances without feeling SOB. She states it is a/w an intermittent dry cough, and denies any SOB at rest.   Also denies fevers, chills, productive cough, night sweats, pain on inspiration, recent travel, prolonged immobilization, N/V, lightheadedness, dizziness, or calf pain. Patient is a 67 y/o female with a PMHx of HTN, HLD, thyroid ca s/p thyroidectomy 9 years ago and recurrence s/p removal of 3 LN from neck on June 22, 2018 presents with cc of shortness of breath  x 2 weeks. She was sent by Dr. Rogers for evaluation to r/o PE due to recent hx of surgery in the neck. Patient states that she remained active after surgery and that 2 weeks after surgery she began to feel short of breath that began gradually and has since become worse. She attributed it to the weather at first but states that it is different from her baseline. She states she can only walk 1 block before she feels short of breath now, but was previously able to walk longer distances without feeling SOB. She states it is a/w an intermittent dry cough, and denies any SOB at rest.   Also denies fevers, chills, productive cough, night sweats, pain on inspiration, recent travel, prolonged immobilization, N/V, lightheadedness, dizziness, or calf pain.

## 2018-08-09 NOTE — ED PROVIDER NOTE - CHPI ED SYMPTOMS POS
DIFFICULTY BREATHING/DYSPNEA ON EXERTION/dry, non productive, intermittent/COUGH/SHORTNESS OF BREATH

## 2018-08-10 DIAGNOSIS — E78.2 MIXED HYPERLIPIDEMIA: ICD-10-CM

## 2018-08-10 DIAGNOSIS — E89.0 POSTPROCEDURAL HYPOTHYROIDISM: ICD-10-CM

## 2018-08-10 DIAGNOSIS — R06.09 OTHER FORMS OF DYSPNEA: ICD-10-CM

## 2018-08-10 DIAGNOSIS — I10 ESSENTIAL (PRIMARY) HYPERTENSION: ICD-10-CM

## 2018-08-10 LAB
TROPONIN T SERPL-MCNC: <0.01 NG/ML — SIGNIFICANT CHANGE UP (ref 0–0.06)

## 2018-08-10 PROCEDURE — 99222 1ST HOSP IP/OBS MODERATE 55: CPT

## 2018-08-10 PROCEDURE — 99223 1ST HOSP IP/OBS HIGH 75: CPT

## 2018-08-10 PROCEDURE — 12345: CPT

## 2018-08-10 PROCEDURE — 93010 ELECTROCARDIOGRAM REPORT: CPT

## 2018-08-10 PROCEDURE — 71275 CT ANGIOGRAPHY CHEST: CPT | Mod: 26

## 2018-08-10 RX ORDER — ATORVASTATIN CALCIUM 80 MG/1
20 TABLET, FILM COATED ORAL AT BEDTIME
Qty: 0 | Refills: 0 | Status: DISCONTINUED | OUTPATIENT
Start: 2018-08-10 | End: 2018-08-13

## 2018-08-10 RX ORDER — ENOXAPARIN SODIUM 100 MG/ML
40 INJECTION SUBCUTANEOUS DAILY
Qty: 0 | Refills: 0 | Status: DISCONTINUED | OUTPATIENT
Start: 2018-08-10 | End: 2018-08-13

## 2018-08-10 RX ORDER — LOSARTAN POTASSIUM 100 MG/1
100 TABLET, FILM COATED ORAL DAILY
Qty: 0 | Refills: 0 | Status: DISCONTINUED | OUTPATIENT
Start: 2018-08-10 | End: 2018-08-13

## 2018-08-10 RX ORDER — HYDROCHLOROTHIAZIDE 25 MG
12.5 TABLET ORAL DAILY
Qty: 0 | Refills: 0 | Status: DISCONTINUED | OUTPATIENT
Start: 2018-08-10 | End: 2018-08-13

## 2018-08-10 RX ORDER — LEVOTHYROXINE SODIUM 125 MCG
100 TABLET ORAL DAILY
Qty: 0 | Refills: 0 | Status: DISCONTINUED | OUTPATIENT
Start: 2018-08-10 | End: 2018-08-13

## 2018-08-10 RX ADMIN — Medication 100 MICROGRAM(S): at 05:37

## 2018-08-10 RX ADMIN — Medication 12.5 MILLIGRAM(S): at 05:37

## 2018-08-10 RX ADMIN — LOSARTAN POTASSIUM 100 MILLIGRAM(S): 100 TABLET, FILM COATED ORAL at 05:37

## 2018-08-10 RX ADMIN — ATORVASTATIN CALCIUM 20 MILLIGRAM(S): 80 TABLET, FILM COATED ORAL at 22:40

## 2018-08-10 RX ADMIN — ENOXAPARIN SODIUM 40 MILLIGRAM(S): 100 INJECTION SUBCUTANEOUS at 11:48

## 2018-08-10 NOTE — CONSULT NOTE ADULT - SUBJECTIVE AND OBJECTIVE BOX
VINAYAK RUTH  [id number]      HPI:  69 y/o female with a PMHx of HTN, HLD, thyroid ca (papillary) s/p thyroidectomy 9 years ago and recurrence in 2018 s/p removal of 3 LN from neck on June 22, 2018 (one of which was positive for cancer) presents with c/o shortness of breath progressing over last 2 weeks. She was sent by Dr. Rogers for evaluation to r/o PE due to recent hx of surgery in the neck. Patient states she began to feel short of breath on exertion that began gradually and has since become worse. She attributed it to the weather at first but states that it is different from her baseline. She states she can only walk 1 block before she feels short of breath now, but was previously able to walk longer distances without feeling SOB. She states it is a/w an intermittent dry cough, and denies any SOB at rest. Patient reports no CP a/w SOB or at any other time, or any other associated symptoms.  CTA done in the ED showed no evidence of pulmonary embolism. Patient currently resting comfortably in bed.  Denies palps, orthopnea, PND, syncope.        ALLERGIES:  sulfa drugs (Rash)      PAST MEDICAL & SURGICAL HISTORY:  As noted above    MEDICATIONS (HOME):  · 	oxyCODONE-acetaminophen 5 mg-325 mg oral tablet: 1 tab(s) orally every 6 hours, As Needed -Moderate Pain (4 - 6) MDD:4 tabs  · 	losartan-hydrochlorothiazide 100mg-12.5mg oral tablet: 1 tab(s) orally once a day  · 	Synthroid 100 mcg (0.1 mg) oral tablet: 1 tab(s) orally once a day  · 	Lipitor 20 mg oral tablet: 1 tab(s) orally once a day (at bedtime)      SOCIAL HISTORY:  Patient drinks 2 glasses of wine/night.  Denies tobacco, drug use    FAMILY HISTORY:  Family history of hyperlipidemia (Mother)  Family history of hypertension (Mother)  Family history of brain cancer (Mother)  Family history of COPD (chronic obstructive pulmonary disease) (Father)      ROS:  Patient denies H/A, and other than noted above full ROS is unremarkable      PHYSICAL EXAM:  Vital Signs Last 24 Hrs  T(C): 36.6 (10 Aug 2018 08:24), Max: 37.4 (09 Aug 2018 19:56)  T(F): 97.8 (10 Aug 2018 08:24), Max: 99.3 (09 Aug 2018 19:56)  HR: 67 (10 Aug 2018 08:24) (67 - 82)  BP: 107/72 (10 Aug 2018 08:24) (107/72 - 136/85)  BP(mean): --  RR: 18 (10 Aug 2018 08:24) (17 - 18)  SpO2: 98% (10 Aug 2018 08:24) (97% - 99%)  General: Patient comfortable in NAD  HEENT: NCAT, mmm, EOMI  Neck: no JVD, no carotid bruits  CVS: nl s1, split s2, no s3, no s4, +2/6 sys murmur, RRR  Chest: CTA b/l  Abdomen: soft, nt/nd  Extremities: No c/c/e  Neuro: A&O x3  Psych: Normal affect        LABS:                        12.8   6.7   )-----------( 232      ( 09 Aug 2018 22:59 )             38.1     08-09    138  |  99  |  9.0  ----------------------------<  92  3.8   |  24.0  |  0.50    Ca    9.2      09 Aug 2018 22:59    TPro  7.4  /  Alb  4.2  /  TBili  1.2  /  DBili  x   /  AST  46<H>  /  ALT  34<H>  /  AlkPhos  54  08-09    CARDIAC MARKERS ( 09 Aug 2018 22:59 )  x     / <0.01 ng/mL / 100 U/L / x     / x          PT/INR - ( 09 Aug 2018 22:59 )   PT: 12.0 sec;   INR: 1.09 ratio           RADIOLOGY:  No evidence of pulmonary embolism.  Indeterminate 9 mm right apical hazy groundglass pulmonary nodule.  Indeterminate 8 mm left lower lobe pulmonary nodule.  Advanced hepatic steatosis.        Assessment:  69 y/o female with a PMHx of HTN, HLD, thyroid ca (papillary) s/p thyroidectomy 9 years ago and recurrence in 2018 s/p removal of 3 LN from neck on June 22, 2018 (one of which was positive for cancer) presents with c/o shortness of breath progressing over last 2 weeks.  Patient d/w her cardio who suggested further w/u with stress vs. cath.  Patient with no significant CP, trops negative, EKG nonischemic.  No symptoms at this time.  CTA negative for PE.  No evidence of CHF.    Plan:  1. Tele  2. Trend trop.  3. Echo  4. If above negative and no symptoms could f/u OP for stress testing.  Do not believe cath is needed at this time.    Will follow.  Thanks!

## 2018-08-10 NOTE — H&P ADULT - HISTORY OF PRESENT ILLNESS
Patient is a 67 y/o female with a PMHx of HTN, HLD, thyroid ca (papillary) s/p thyroidectomy 9 years ago and recurrence in 2018 s/p removal of 3 LN from neck on June 22, 2018 (one of which was positive for cancer) presents with cc of shortness of breath  x  3 weeks. She was sent by Dr. Rogers for evaluation to r/o PE due to recent hx of surgery in the neck. Patient states that she remained active after surgery and that 2 weeks after surgery she began to feel short of breath on exertion that began gradually and has since become worse. She attributed it to the weather at first but states that it is different from her baseline. She states she can only walk 1 block before she feels short of breath now, but was previously able to walk longer distances without feeling SOB. She states it is a/w an intermittent dry cough, and denies any SOB at rest. The exertional SOB is accompanied by exertional chest pressure "funny feeling in chest". It is non radiating, non pleuritic, non positional, worsened with exertion & improved with rest. No palpitations, light headedness/dizziness, weight loss, weight gain, PND, orthopnea, leg edema,  fevers/chills, abdominal pain, n/v, diarrhea/constipation, dysuria or increased urinary frequency, night sweats,  recent travel, prolonged immobilization, calf pain. CTA done in the ED was -ve for PE. Pt & her specialists are aware of her pulm nodules and she had an outpatient PET which was negative but was going to get "radiation pills" for metastatic disease. Dr. Barajas has been managing her thyroid cancer. Pt was seeing her cardiologist for the exertional SOB wand was supposed to schedule a NST as outpatient but hasn't done it yet Patient is a 67 y/o female with a PMHx of HTN, HLD, thyroid ca (papillary) s/p thyroidectomy 9 years ago and recurrence in 2018 s/p removal of 3 LN from neck on June 22, 2018 (one of which was positive for cancer) presents with cc of shortness of breath  x  3 weeks. She was sent by Dr. Rogers for evaluation to r/o PE due to recent hx of surgery in the neck. Patient states that she remained active after surgery and that 2 weeks after surgery she began to feel short of breath on exertion that began gradually and has since become worse. She attributed it to the weather at first but states that it is different from her baseline. She states she can only walk 1 block before she feels short of breath now, but was previously able to walk longer distances without feeling SOB. She states it is a/w an intermittent dry cough, and denies any SOB at rest. The exertional SOB is accompanied by exertional chest pressure "funny feeling in chest". It is non radiating, non pleuritic, non positional, worsened with exertion & improved with rest. No palpitations, light headedness/dizziness, weight loss, weight gain, PND, orthopnea, leg edema,  fevers/chills, abdominal pain, n/v, diarrhea/constipation, dysuria or increased urinary frequency, night sweats,  recent travel, prolonged immobilization, calf pain. CTA done in the ED was -ve for PE. Pt & her specialists are aware of her pulm nodules and she had an outpatient PET which was negative but was going to get "radiation pills" for metastatic disease. Dr. Barajas has been managing her thyroid cancer. Pt was seeing her cardiologist for the exertional SOB and was supposed to schedule a NST as outpatient but has not done it yet Patient is a 69 y/o female with a PMHx of HTN, HLD, thyroid ca (papillary) s/p thyroidectomy 9 years ago and recurrence in 2018 s/p removal of 3 LN from neck on June 22, 2018 (one of which was positive for cancer) presents with cc of shortness of breath  x  3 weeks. She was sent by Dr. Rogers for evaluation to r/o PE due to recent hx of surgery in the neck. Patient states that she remained active after surgery and that 2 weeks after surgery she began to feel short of breath on exertion that began gradually and has since become worse. She attributed it to the weather at first but states that it is different from her baseline. She states she can only walk 1 block before she feels short of breath now, but was previously able to walk longer distances without feeling SOB. She states it is a/w an intermittent dry cough, and denies any SOB at rest. The exertional SOB is accompanied by exertional chest pressure "funny feeling in chest". It is non radiating, non pleuritic, non positional, worsened with exertion & improved with rest. No palpitations, light headedness/dizziness, weight loss, weight gain, PND, orthopnea, leg edema,  fevers/chills, abdominal pain, n/v, diarrhea/constipation, dysuria or increased urinary frequency, night sweats,  recent travel, prolonged immobilization, calf pain. CTA done in the ED: No evidence of pulmonary embolism.Indeterminate 9 mm right apical hazy groundglass pulmonary nodule.Indeterminate 8 mm left lower lobe pulmonary nodule.Advanced hepatic steatosis.  Pt & her specialists are aware of her pulm nodules and she had an outpatient PET which was negative but was going to get "radiation pills" for metastatic disease. Dr. Barajas has been managing her thyroid cancer. Pt was seeing her cardiologist for the exertional SOB and was supposed to schedule a NST as outpatient but has not done it yet

## 2018-08-10 NOTE — PATIENT PROFILE ADULT. - NS TRANSFER PATIENT BELONGINGS
Other belongings/toiletries, reading glasses, contacts/Cell Phone/PDA (specify) Cell Phone/PDA (specify)/Other belongings/Clothing/purse, flipflops/Jewelry/Money (specify)

## 2018-08-10 NOTE — PROGRESS NOTE ADULT - ASSESSMENT
68 year old female thyroid cancer, hypothyroid, htn, hyperlipidemia with shortness of breath status post recent resection of thyroid recurrence.

## 2018-08-10 NOTE — CONSULT NOTE ADULT - ASSESSMENT
No evidence pulmonary pathology to explain inc SOB  Cardiac w/u as out pt  pulmonary mets stable    Rec:  Will sign off  recall PRN

## 2018-08-10 NOTE — H&P ADULT - FAMILY HISTORY
Mother  Still living? Unknown  Family history of brain cancer, Age at diagnosis: Age Unknown  Family history of hypertension, Age at diagnosis: Age Unknown  Family history of hyperlipidemia, Age at diagnosis: Age Unknown     Father  Still living? Unknown  Family history of COPD (chronic obstructive pulmonary disease), Age at diagnosis: Age Unknown

## 2018-08-10 NOTE — ED ADULT NURSE REASSESSMENT NOTE - NS ED NURSE REASSESS COMMENT FT1
Pt resting comfortably in stretcher, resp even and unlabored, sat @ 99% on room air, awaiting room assignment, pt aware of plan of care, offers no complaints at this time, will continue to monitor.

## 2018-08-10 NOTE — PATIENT PROFILE ADULT. - HOW PATIENT ADDRESSED, PROFILE
Christy


 

















BETHEL RICHARDSON Apr 11, 2017 13:17 Julia

## 2018-08-10 NOTE — H&P ADULT - ASSESSMENT
Patient is a 67 y/o female with a PMHx of HTN, HLD, thyroid ca (papillary) s/p thyroidectomy 9 years ago and recurrence in 2018 s/p removal of 3 LN from neck on June 22, 2018 (one of which was positive for cancer) presents with cc of shortness of breath  x  3 weeks. She was sent by Dr. Rogers for evaluation to r/o PE due to recent hx of surgery in the neck. Patient states that she remained active after surgery and that 2 weeks after surgery she began to feel short of breath on exertion that began gradually and has since become worse. She attributed it to the weather at first but states that it is different from her baseline. She states she can only walk 1 block before she feels short of breath now, but was previously able to walk longer distances without feeling SOB. She states it is a/w an intermittent dry cough, and denies any SOB at rest. The exertional SOB is accompanied by exertional chest pressure "funny feeling in chest". It is non radiating, non pleuritic, non positional, worsened with exertion & improved with rest. No palpitations, light headedness/dizziness, weight loss, weight gain, PND, orthopnea, leg edema,  fevers/chills, abdominal pain, n/v, diarrhea/constipation, dysuria or increased urinary frequency, night sweats,  recent travel, prolonged immobilization, calf pain. CTA done in the ED: No evidence of pulmonary embolism.Indeterminate 9 mm right apical hazy groundglass pulmonary nodule.Indeterminate 8 mm left lower lobe pulmonary nodule.Advanced hepatic steatosis.  Pt & her specialists are aware of her pulm nodules and she had an outpatient PET which was negative but was going to get "radiation pills" for metastatic disease. Dr. Barajas has been managing her thyroid cancer. Pt was seeing her cardiologist for the exertional SOB and was supposed to schedule a NST as outpatient but has not done it yet. C        >Exertional dyspnea & chest pain-  Admit to tele  CTA -ve for PE  Pulmonary nodules unlikely the cause of dyspnea, Follow up CT as outpatient, pulm called  serial EKG  TnI x 3  Echo  [facility] cardio consult called for possible NST    > HTN/ HLD- c/w home meds    >thyroid ca (papillary) s/p thyroidectomy 9 years ago and recurrence in 2018 s/p removal of 3 LN from neck- Dr. Barajas called for f/u    >Iatrogenic hypothyroidism- c/w synthroid, check TSH    DVT ppx Patient is a 69 y/o female with a PMHx of HTN, HLD, thyroid ca (papillary) s/p thyroidectomy 9 years ago and recurrence in 2018 s/p removal of 3 LN from neck on June 22, 2018 (one of which was positive for cancer) presents with cc of shortness of breath  x  3 weeks. She was sent by Dr. Rogers for evaluation to r/o PE due to recent hx of surgery in the neck. Patient states that she remained active after surgery and that 2 weeks after surgery she began to feel short of breath on exertion that began gradually and has since become worse. She attributed it to the weather at first but states that it is different from her baseline. She states she can only walk 1 block before she feels short of breath now, but was previously able to walk longer distances without feeling SOB. She states it is a/w an intermittent dry cough, and denies any SOB at rest. The exertional SOB is accompanied by exertional chest pressure "funny feeling in chest". It is non radiating, non pleuritic, non positional, worsened with exertion & improved with rest. No palpitations, light headedness/dizziness, weight loss, weight gain, PND, orthopnea, leg edema,  fevers/chills, abdominal pain, n/v, diarrhea/constipation, dysuria or increased urinary frequency, night sweats,  recent travel, prolonged immobilization, calf pain. CTA done in the ED: No evidence of pulmonary embolism.Indeterminate 9 mm right apical hazy groundglass pulmonary nodule.Indeterminate 8 mm left lower lobe pulmonary nodule.Advanced hepatic steatosis.  Pt & her specialists are aware of her pulm nodules and she had an outpatient PET which was negative but was going to get "radiation pills" for metastatic disease. Dr. Barajas has been managing her thyroid cancer. Pt was seeing her cardiologist for the exertional SOB and was supposed to schedule a NST as outpatient but has not done it yet.         >Exertional dyspnea & chest pain-  Admit to tele  CTA -ve for PE  Pulmonary nodules unlikely the cause of dyspnea, Follow up CT as outpatient, pulm called  serial EKG  TnI x 3  Echo  [facility] cardio consult called for possible NST    > HTN/ HLD- c/w home meds    >thyroid ca (papillary) s/p thyroidectomy 9 years ago and recurrence in 2018 s/p removal of 3 LN from neck- Dr. Barajas called for f/u    >Iatrogenic hypothyroidism- c/w synthroid, check TSH    >Mild Transaminitis- likely from hepatic steatosis as seen on CT, c/w statin, f/u LFT trend    DVT ppx

## 2018-08-10 NOTE — CONSULT NOTE ADULT - SUBJECTIVE AND OBJECTIVE BOX
PULMONARY CONSULT NOTE      EARL [id number]    Patient is a 68y old  Female who presents with a chief complaint of SOB (10 Aug 2018 03:08)      HISTORY OF PRESENT ILLNESS:  Patient is a 67 y/o female with a PMHx of HTN, HLD, thyroid ca (papillary) s/p thyroidectomy 9 years ago and recurrence in 2018 s/p removal of 3 LN from neck on June 22, 2018 (one of which was positive for cancer) presents with cc of shortness of breath  x  3 weeks. She was sent by Dr. Rogers for evaluation to r/o PE due to recent hx of surgery in the neck. Patient states that she remained active after surgery and that 2 weeks after surgery she began to feel short of breath on exertion that began gradually and has since become worse. She attributed it to the weather at first but states that it is different from her baseline. She states she can only walk 1 block before she feels short of breath now, but was previously able to walk longer distances without feeling SOB. She states it is a/w an intermittent dry cough, and denies any SOB at rest. The exertional SOB is accompanied by exertional chest pressure "funny feeling in chest". It is non radiating, non pleuritic, non positional, worsened with exertion & improved with rest. No palpitations, light headedness/dizziness, weight loss, weight gain, PND, orthopnea, leg edema,  fevers/chills, abdominal pain, n/v, diarrhea/constipation, dysuria or increased urinary frequency, night sweats,  recent travel, prolonged immobilization, calf pain. CTA done in the ED: No evidence of pulmonary embolism.Indeterminate 9 mm right apical hazy groundglass pulmonary nodule.Indeterminate 8 mm left lower lobe pulmonary nodule.Advanced hepatic steatosis.  Pt & her specialists are aware of her pulm nodules and she had an outpatient PET which was negative but was going to get "radiation pills" for metastatic disease. Dr. Barajas has been managing her thyroid cancer. Pt was seeing her cardiologist for the exertional SOB and was supposed to schedule a NST as outpatient but has not done it yet.    Spirometry in our office yesterday normal    MEDICATIONS  (STANDING):  atorvastatin 20 milliGRAM(s) Oral at bedtime  enoxaparin Injectable 40 milliGRAM(s) SubCutaneous daily  hydrochlorothiazide 12.5 milliGRAM(s) Oral daily  levothyroxine 100 MICROGram(s) Oral daily  losartan 100 milliGRAM(s) Oral daily      MEDICATIONS  (PRN):      Allergies    sulfa drugs (Rash)    Intolerances        PAST MEDICAL & SURGICAL HISTORY:  Hypothyroid  History of thyroid cancer  H/O heartburn  Hyperlipidemia  Hypertension  H/O thyroidectomy: 2011      FAMILY HISTORY:  Family history of hyperlipidemia (Mother)  Family history of hypertension (Mother)  Family history of brain cancer (Mother)  Family history of COPD (chronic obstructive pulmonary disease) (Father)      SOCIAL HISTORY  Smoking History:     REVIEW OF SYSTEMS:    CONSTITUTIONAL:  No fevers, chills, sweats    HEENT:  Eyes:  No diplopia or blurred vision. ENT:  No earache, sore throat or runny nose. sinus headache or postnasl drip    CARDIOVASCULAR:  No pressure, squeezing, tightness, or heaviness about the chest; no palpitations, leg swelling, orthopnea or PND    RESPIRATORY:  No cough, shortness of breath, PND or orthopnea. Mild SOBOE    GASTROINTESTINAL:  No abdominal pain, nausea, vomiting or diarrhea.    GENITOURINARY:  No dysuria, frequency or urgency.    NEUROLOGIC:  No paresthesias, fasciculations, seizures or weakness.    PSYCHIATRIC:  No disorder of thought or mood.    Vital Signs Last 24 Hrs  T(C): 36.6 (10 Aug 2018 08:24), Max: 37.4 (09 Aug 2018 19:56)  T(F): 97.8 (10 Aug 2018 08:24), Max: 99.3 (09 Aug 2018 19:56)  HR: 67 (10 Aug 2018 08:24) (67 - 82)  BP: 107/72 (10 Aug 2018 08:24) (107/72 - 136/85)  BP(mean): --  RR: 18 (10 Aug 2018 08:24) (17 - 18)  SpO2: 98% (10 Aug 2018 08:24) (97% - 99%)    PHYSICAL EXAMINATION:    GENERAL: The patient is a well-developed, well-nourished _____in no apparent distress.     HEENT: Head is normocephalic and atraumatic. Extraocular muscles are intact. Mucous membranes are moist.     NECK: Supple.     LUNGS: Clear to auscultation without wheezing, rales, or rhonchi. Respirations unlabored    HEART: Regular rate and rhythm without murmur.    ABDOMEN: Soft, nontender, and nondistended.  No hepatosplenomegaly is noted.    EXTREMITIES: Without any cyanosis, clubbing, rash, lesions or edema.    NEUROLOGIC: Grossly intact.      LABS:                        12.8   6.7   )-----------( 232      ( 09 Aug 2018 22:59 )             38.1     08-09    138  |  99  |  9.0  ----------------------------<  92  3.8   |  24.0  |  0.50    Ca    9.2      09 Aug 2018 22:59    TPro  7.4  /  Alb  4.2  /  TBili  1.2  /  DBili  x   /  AST  46<H>  /  ALT  34<H>  /  AlkPhos  54  08-09    PT/INR - ( 09 Aug 2018 22:59 )   PT: 12.0 sec;   INR: 1.09 ratio               CARDIAC MARKERS ( 09 Aug 2018 22:59 )  x     / <0.01 ng/mL / 100 U/L / x     / x            Serum Pro-Brain Natriuretic Peptide: 974 pg/mL (08-09-18 @ 22:59)          MICROBIOLOGY:    RADIOLOGY & ADDITIONAL STUDIES:  < from: CT Angio Chest w/ IV Cont (08.10.18 @ 01:04) >     EXAM:  CT ANGIO CHEST (W)AW IC                          PROCEDURE DATE:  08/10/2018          INTERPRETATION:  CLINICAL HISTORY: Shortness of breath. Rule out   pulmonary embolism.     COMPARISON: None.    TECHNIQUE: Thoracic CT scan with pulmonaryembolism protocol was   performed with the administration of intravenous contrast.    Total of 60 cc of Omnipaque 350 was injected intravenously. Coronal,   sagittal, and sagittal oblique reformations were obtained. Axial MIP   images were obtained.    FINDINGS:     There is no evidence of pulmonary embolism.     The thoracic aorta and main pulmonary artery are normal in caliber. The   heart is normal size.  Mild atheromatous calcification along the aorta is   noted.    There is a 9 mm right apical hazy groundglass nodule (9-53).    Indeterminate left lower lobe pulmonary nodule measures 8 mm and contains   a faint calcification (9-113). Granuloma, hamartoma, and alternative   lesions are included in the differential.    Biapical fibronodularpleural parenchymal thickening and scarring is   noted.    There is no pericardial or pleural effusion. There is no pneumothorax or   pneumomediastinum.    There is no axillary, mediastinal, or hilar lymphadenopathy.     Limited images of the upper abdomen shows diffuse low-attenuation liver   due to advanced steatosis.    IMPRESSION:     No evidence of pulmonary embolism.    Indeterminate 9 mm right apical hazy groundglass pulmonary nodule.    Indeterminate 8 mm left lower lobe pulmonary nodule.Follow up as per   Fleischner criteria below.    Advanced hepatic steatosis.    Nodule size greater than 8 mm:    Low-risk patient --Follow up CT at 3, 9, and 24 months.  Dynamic contrast   head CT, PET, and/or biopsy.    High-risk patient -- Same asfor low-risk patient.    Nodule size 6 to 8 mm:    Low-risk patient -- Initial follow-up CT at success 12 months then at 18   to 24 months if no change.    High-risk patient -- Initial follow-up CT at 3 to 6 months then at 9 -12   and 24 months is nochange.    Reference: Radiology 2005; 237: 395 -- 400.                SHAKIRA HOWARD M.D., ATTENDING RADIOLOGIST  This document has been electronically signed. Aug 10 2018  1:59AM        < end of copied text >  All films reviewed on PACS PULMONARY CONSULT NOTE      EARL [id number]    Patient is a 68y old  Female who presents with a chief complaint of SOB (10 Aug 2018 03:08)      HISTORY OF PRESENT ILLNESS:  Patient is a 69 y/o female with a PMHx of HTN, HLD, thyroid ca (papillary) s/p thyroidectomy 9 years ago and recurrence in 2018 s/p removal of 3 LN from neck on June 22, 2018 (one of which was positive for cancer) presents with cc of shortness of breath  x  3 weeks. She was sent by Dr. Rogers for evaluation to r/o PE due to recent hx of surgery in the neck. Patient states that she remained active after surgery and that 2 weeks after surgery she began to feel short of breath on exertion that began gradually and has since become worse. She attributed it to the weather at first but states that it is different from her baseline. She states she can only walk 1 block before she feels short of breath now, but was previously able to walk longer distances without feeling SOB. She states it is a/w an intermittent dry cough, and denies any SOB at rest. The exertional SOB is accompanied by exertional chest pressure "funny feeling in chest". It is non radiating, non pleuritic, non positional, worsened with exertion & improved with rest. No palpitations, light headedness/dizziness, weight loss, weight gain, PND, orthopnea, leg edema,  fevers/chills, abdominal pain, n/v, diarrhea/constipation, dysuria or increased urinary frequency, night sweats,  recent travel, prolonged immobilization, calf pain. CTA done in the ED: No evidence of pulmonary embolism.Indeterminate 9 mm right apical hazy groundglass pulmonary nodule.Indeterminate 8 mm left lower lobe pulmonary nodule.Advanced hepatic steatosis.  Pt & her specialists are aware of her pulm nodules and she had an outpatient PET which was negative but was going to get "radiation pills" for metastatic disease. Dr. Barajas has been managing her thyroid cancer. Pt was seeing her cardiologist for the exertional SOB and was supposed to schedule a NST as outpatient but has not done it yet.    Spirometry in our office yesterday normal    Pt denies chest pain cough, wheeze or sputum this am    MEDICATIONS  (STANDING):  atorvastatin 20 milliGRAM(s) Oral at bedtime  enoxaparin Injectable 40 milliGRAM(s) SubCutaneous daily  hydrochlorothiazide 12.5 milliGRAM(s) Oral daily  levothyroxine 100 MICROGram(s) Oral daily  losartan 100 milliGRAM(s) Oral daily      MEDICATIONS  (PRN):      Allergies    sulfa drugs (Rash)    Intolerances        PAST MEDICAL & SURGICAL HISTORY:  Hypothyroid  History of thyroid cancer  H/O heartburn  Hyperlipidemia  Hypertension  H/O thyroidectomy: 2011      FAMILY HISTORY:  Family history of hyperlipidemia (Mother)  Family history of hypertension (Mother)  Family history of brain cancer (Mother)  Family history of COPD (chronic obstructive pulmonary disease) (Father)      SOCIAL HISTORY  Smoking History:     REVIEW OF SYSTEMS:    CONSTITUTIONAL:  No fevers, chills, sweats    HEENT:  Eyes:  No diplopia or blurred vision. ENT:  No earache, sore throat or runny nose. sinus headache or postnasl drip    CARDIOVASCULAR:  No pressure, squeezing, tightness, or heaviness about the chest; no palpitations, leg swelling, orthopnea or PND    RESPIRATORY:  No cough, shortness of breath, PND or orthopnea. Mild SOBOE    GASTROINTESTINAL:  No abdominal pain, nausea, vomiting or diarrhea.    GENITOURINARY:  No dysuria, frequency or urgency.    NEUROLOGIC:  No paresthesias, fasciculations, seizures or weakness.    PSYCHIATRIC:  No disorder of thought or mood.    Vital Signs Last 24 Hrs  T(C): 36.6 (10 Aug 2018 08:24), Max: 37.4 (09 Aug 2018 19:56)  T(F): 97.8 (10 Aug 2018 08:24), Max: 99.3 (09 Aug 2018 19:56)  HR: 67 (10 Aug 2018 08:24) (67 - 82)  BP: 107/72 (10 Aug 2018 08:24) (107/72 - 136/85)  BP(mean): --  RR: 18 (10 Aug 2018 08:24) (17 - 18)  SpO2: 98% (10 Aug 2018 08:24) (97% - 99%)    PHYSICAL EXAMINATION:    GENERAL: The patient is a well-developed, well-nourished _____in no apparent distress.     HEENT: Head is normocephalic and atraumatic. Extraocular muscles are intact. Mucous membranes are moist.     NECK: Supple.     LUNGS: Clear to auscultation without wheezing, rales, or rhonchi. Respirations unlabored    HEART: Regular rate and rhythm without murmur.    ABDOMEN: Soft, nontender, and nondistended.  No hepatosplenomegaly is noted.    EXTREMITIES: Without any cyanosis, clubbing, rash, lesions or edema.    NEUROLOGIC: Grossly intact.      LABS:                        12.8   6.7   )-----------( 232      ( 09 Aug 2018 22:59 )             38.1     08-09    138  |  99  |  9.0  ----------------------------<  92  3.8   |  24.0  |  0.50    Ca    9.2      09 Aug 2018 22:59    TPro  7.4  /  Alb  4.2  /  TBili  1.2  /  DBili  x   /  AST  46<H>  /  ALT  34<H>  /  AlkPhos  54  08-09    PT/INR - ( 09 Aug 2018 22:59 )   PT: 12.0 sec;   INR: 1.09 ratio               CARDIAC MARKERS ( 09 Aug 2018 22:59 )  x     / <0.01 ng/mL / 100 U/L / x     / x            Serum Pro-Brain Natriuretic Peptide: 974 pg/mL (08-09-18 @ 22:59)          MICROBIOLOGY:    RADIOLOGY & ADDITIONAL STUDIES:  < from: CT Angio Chest w/ IV Cont (08.10.18 @ 01:04) >     EXAM:  CT ANGIO CHEST (W)AW IC                          PROCEDURE DATE:  08/10/2018          INTERPRETATION:  CLINICAL HISTORY: Shortness of breath. Rule out   pulmonary embolism.     COMPARISON: None.    TECHNIQUE: Thoracic CT scan with pulmonaryembolism protocol was   performed with the administration of intravenous contrast.    Total of 60 cc of Omnipaque 350 was injected intravenously. Coronal,   sagittal, and sagittal oblique reformations were obtained. Axial MIP   images were obtained.    FINDINGS:     There is no evidence of pulmonary embolism.     The thoracic aorta and main pulmonary artery are normal in caliber. The   heart is normal size.  Mild atheromatous calcification along the aorta is   noted.    There is a 9 mm right apical hazy groundglass nodule (9-53).    Indeterminate left lower lobe pulmonary nodule measures 8 mm and contains   a faint calcification (9-113). Granuloma, hamartoma, and alternative   lesions are included in the differential.    Biapical fibronodularpleural parenchymal thickening and scarring is   noted.    There is no pericardial or pleural effusion. There is no pneumothorax or   pneumomediastinum.    There is no axillary, mediastinal, or hilar lymphadenopathy.     Limited images of the upper abdomen shows diffuse low-attenuation liver   due to advanced steatosis.    IMPRESSION:     No evidence of pulmonary embolism.    Indeterminate 9 mm right apical hazy groundglass pulmonary nodule.    Indeterminate 8 mm left lower lobe pulmonary nodule.Follow up as per   Fleischner criteria below.    Advanced hepatic steatosis.    Nodule size greater than 8 mm:    Low-risk patient --Follow up CT at 3, 9, and 24 months.  Dynamic contrast   head CT, PET, and/or biopsy.    High-risk patient -- Same asfor low-risk patient.    Nodule size 6 to 8 mm:    Low-risk patient -- Initial follow-up CT at success 12 months then at 18   to 24 months if no change.    High-risk patient -- Initial follow-up CT at 3 to 6 months then at 9 -12   and 24 months is nochange.    Reference: Radiology 2005; 237: 395 -- 400.                SHAKIRA HOWARD M.D., ATTENDING RADIOLOGIST  This document has been electronically signed. Aug 10 2018  1:59AM        < end of copied text >  All films reviewed on PACS

## 2018-08-10 NOTE — PROGRESS NOTE ADULT - SUBJECTIVE AND OBJECTIVE BOX
VINAYAK RUTH     Chief Complaint: Patient is a 68y old  Female who presents with a chief complaint of SOB (10 Aug 2018 03:08)      PAST MEDICAL & SURGICAL HISTORY:  Hypothyroid  History of thyroid cancer  H/O heartburn  Hyperlipidemia  Hypertension  H/O thyroidectomy: 2011      HPI/OVERNIGHT EVENTS: Patient feels much better than she did yesterday.    MEDICATIONS  (STANDING):  atorvastatin 20 milliGRAM(s) Oral at bedtime  enoxaparin Injectable 40 milliGRAM(s) SubCutaneous daily  hydrochlorothiazide 12.5 milliGRAM(s) Oral daily  levothyroxine 100 MICROGram(s) Oral daily  losartan 100 milliGRAM(s) Oral daily      Vital Signs Last 24 Hrs  T(C): 36.6 (10 Aug 2018 08:24), Max: 37.4 (09 Aug 2018 19:56)  T(F): 97.8 (10 Aug 2018 08:24), Max: 99.3 (09 Aug 2018 19:56)  HR: 67 (10 Aug 2018 08:24) (67 - 82)  BP: 107/72 (10 Aug 2018 08:24) (107/72 - 136/85)  BP(mean): --  RR: 18 (10 Aug 2018 08:24) (17 - 18)  SpO2: 98% (10 Aug 2018 08:24) (97% - 99%)    PHYSICAL EXAM:  Constitutional: NAD, well-groomed, well-developed  HEENT: PERRLA, EOMI, Normal Hearing, MMM  Neck: No LAD, No JVD  Back: Normal spine flexure, No CVA tenderness  Respiratory: CTAB Cardiovascular: S1 and S2, RRR, no M/G/R  Gastrointestinal: BS+, soft, NT/ND  Extremities: No peripheral edema  Vascular: 2+ peripheral pulses  Neurological: A/O x 3, no focal deficits  Psychiatric: Normal mood, normal affect  Musculoskeletal: 5/5 strength b/l upper and lower extremities  Skin: No rashes    CAPILLARY BLOOD GLUCOSE    LABS:                        12.8   6.7   )-----------( 232      ( 09 Aug 2018 22:59 )             38.1     08-09    138  |  99  |  9.0  ----------------------------<  92  3.8   |  24.0  |  0.50    Ca    9.2      09 Aug 2018 22:59    TPro  7.4  /  Alb  4.2  /  TBili  1.2  /  DBili  x   /  AST  46<H>  /  ALT  34<H>  /  AlkPhos  54  08-09    PT/INR - ( 09 Aug 2018 22:59 )   PT: 12.0 sec;   INR: 1.09 ratio               RADIOLOGY & ADDITIONAL TESTS:

## 2018-08-10 NOTE — PROGRESS NOTE ADULT - PROBLEM SELECTOR PLAN 1
Pulmonary and cardiology input appreciated. Ct angio confirms no pulm emb. Assess echo and monitor cardiac rate. Echocardiogram and trend enzymes.

## 2018-08-11 ENCOUNTER — TRANSCRIPTION ENCOUNTER (OUTPATIENT)
Age: 68
End: 2018-08-11

## 2018-08-11 LAB
ALBUMIN SERPL ELPH-MCNC: 3.5 G/DL — SIGNIFICANT CHANGE UP (ref 3.3–5.2)
ALP SERPL-CCNC: 49 U/L — SIGNIFICANT CHANGE UP (ref 40–120)
ALT FLD-CCNC: 30 U/L — SIGNIFICANT CHANGE UP
ANION GAP SERPL CALC-SCNC: 10 MMOL/L — SIGNIFICANT CHANGE UP (ref 5–17)
AST SERPL-CCNC: 34 U/L — HIGH
BILIRUB DIRECT SERPL-MCNC: 0.2 MG/DL — SIGNIFICANT CHANGE UP (ref 0–0.3)
BILIRUB INDIRECT FLD-MCNC: 1 MG/DL — SIGNIFICANT CHANGE UP (ref 0.2–1)
BILIRUB SERPL-MCNC: 1.2 MG/DL — SIGNIFICANT CHANGE UP (ref 0.4–2)
BUN SERPL-MCNC: 10 MG/DL — SIGNIFICANT CHANGE UP (ref 8–20)
CALCIUM SERPL-MCNC: 8.9 MG/DL — SIGNIFICANT CHANGE UP (ref 8.6–10.2)
CHLORIDE SERPL-SCNC: 101 MMOL/L — SIGNIFICANT CHANGE UP (ref 98–107)
CO2 SERPL-SCNC: 28 MMOL/L — SIGNIFICANT CHANGE UP (ref 22–29)
CREAT SERPL-MCNC: 0.66 MG/DL — SIGNIFICANT CHANGE UP (ref 0.5–1.3)
GLUCOSE SERPL-MCNC: 109 MG/DL — SIGNIFICANT CHANGE UP (ref 70–115)
HCT VFR BLD CALC: 36.7 % — LOW (ref 37–47)
HGB BLD-MCNC: 12.2 G/DL — SIGNIFICANT CHANGE UP (ref 12–16)
MAGNESIUM SERPL-MCNC: 1.9 MG/DL — SIGNIFICANT CHANGE UP (ref 1.6–2.6)
MCHC RBC-ENTMCNC: 33.2 G/DL — SIGNIFICANT CHANGE UP (ref 32–36)
MCHC RBC-ENTMCNC: 33.3 PG — HIGH (ref 27–31)
MCV RBC AUTO: 100.3 FL — HIGH (ref 81–99)
PHOSPHATE SERPL-MCNC: 4.5 MG/DL — SIGNIFICANT CHANGE UP (ref 2.4–4.7)
PLATELET # BLD AUTO: 197 K/UL — SIGNIFICANT CHANGE UP (ref 150–400)
POTASSIUM SERPL-MCNC: 3.8 MMOL/L — SIGNIFICANT CHANGE UP (ref 3.5–5.3)
POTASSIUM SERPL-SCNC: 3.8 MMOL/L — SIGNIFICANT CHANGE UP (ref 3.5–5.3)
PROT SERPL-MCNC: 6.2 G/DL — LOW (ref 6.6–8.7)
RBC # BLD: 3.66 M/UL — LOW (ref 4.4–5.2)
RBC # FLD: 12.4 % — SIGNIFICANT CHANGE UP (ref 11–15.6)
SODIUM SERPL-SCNC: 139 MMOL/L — SIGNIFICANT CHANGE UP (ref 135–145)
WBC # BLD: 5.4 K/UL — SIGNIFICANT CHANGE UP (ref 4.8–10.8)
WBC # FLD AUTO: 5.4 K/UL — SIGNIFICANT CHANGE UP (ref 4.8–10.8)

## 2018-08-11 PROCEDURE — 99232 SBSQ HOSP IP/OBS MODERATE 35: CPT

## 2018-08-11 RX ORDER — LOSARTAN POTASSIUM 100 MG/1
1 TABLET, FILM COATED ORAL
Qty: 0 | Refills: 0 | COMMUNITY
Start: 2018-08-11

## 2018-08-11 RX ORDER — ACETAMINOPHEN 500 MG
650 TABLET ORAL ONCE
Qty: 0 | Refills: 0 | Status: COMPLETED | OUTPATIENT
Start: 2018-08-11 | End: 2018-08-11

## 2018-08-11 RX ADMIN — ENOXAPARIN SODIUM 40 MILLIGRAM(S): 100 INJECTION SUBCUTANEOUS at 23:03

## 2018-08-11 RX ADMIN — Medication 12.5 MILLIGRAM(S): at 11:54

## 2018-08-11 RX ADMIN — Medication 100 MICROGRAM(S): at 06:36

## 2018-08-11 RX ADMIN — ATORVASTATIN CALCIUM 20 MILLIGRAM(S): 80 TABLET, FILM COATED ORAL at 23:03

## 2018-08-11 RX ADMIN — Medication 650 MILLIGRAM(S): at 23:43

## 2018-08-11 RX ADMIN — LOSARTAN POTASSIUM 100 MILLIGRAM(S): 100 TABLET, FILM COATED ORAL at 11:54

## 2018-08-11 NOTE — DISCHARGE NOTE ADULT - HOSPITAL COURSE
67 y/o female with a PMHx of HTN, HLD, thyroid ca (papillary) s/p thyroidectomy 9 years ago and recurrence in 2018 s/p removal of 3 LN from neck on June 22, 2018 (one of which was positive for cancer) presents with cc of shortness of breath  x  3 weeks.  bnp elevated no clear fluid overload  CT angio no PE + nodule 8mm  Thyroid nodule + malignancy LN negative    Echo   1.Technically difficult study.   2. Normal global left ventricular systolic function.   3. Spectral Doppler shows impaired relaxation pattern of left   ventricular myocardial filling (Grade I diastolic dysfunction).   4. Normal right ventricular size and function.   5. The left atrium is normal in size.   6. Sclerotic aortic valve with normal opening.  Stress test on 7-14-19 67 y/o female with a PMHx of HTN, HLD, thyroid ca (papillary) s/p thyroidectomy 9 years ago and recurrence in 2018 s/p removal of 3 LN from neck on June 22, 2018 (one of which was positive for cancer) presents with cc of shortness of breath  x  3 weeks.  bnp elevated no clear fluid overload  CT angio no PE + nodule 8mm  Thyroid nodule + malignancy LN negative    Echo   1.Technically difficult study.   2. Normal global left ventricular systolic function.   3. Spectral Doppler shows impaired relaxation pattern of left   ventricular myocardial filling (Grade I diastolic dysfunction).   4. Normal right ventricular size and function.   5. The left atrium is normal in size.   6. Sclerotic aortic valve with normal opening.  Stress test on 8/13 was negative and the patient was cleared for discharge home.

## 2018-08-11 NOTE — DISCHARGE NOTE ADULT - CARE PROVIDER_API CALL
Dionisio Rogel), Cardiovascular Disease; Internal Medicine  [address] [place] [address]  Phone: [phone]  Fax: [phone]

## 2018-08-11 NOTE — DISCHARGE NOTE ADULT - SECONDARY DIAGNOSIS.
Chronic diastolic heart failure Essential hypertension History of thyroid cancer Postoperative hypothyroidism

## 2018-08-11 NOTE — PROGRESS NOTE ADULT - SUBJECTIVE AND OBJECTIVE BOX
VINAYAK RUTH     Chief Complaint: Patient is a 68y old  Female who presents with a chief complaint of SOB (11 Aug 2018 15:21)      PAST MEDICAL & SURGICAL HISTORY:  Hypothyroid  History of thyroid cancer  H/O heartburn  Hyperlipidemia  Hypertension  H/O thyroidectomy: 2011      HPI/OVERNIGHT EVENTS: Patient in no distress, cardiology input appreciated.    MEDICATIONS  (STANDING):  atorvastatin 20 milliGRAM(s) Oral at bedtime  enoxaparin Injectable 40 milliGRAM(s) SubCutaneous daily  hydrochlorothiazide 12.5 milliGRAM(s) Oral daily  levothyroxine 100 MICROGram(s) Oral daily  losartan 100 milliGRAM(s) Oral daily      Vital Signs Last 24 Hrs  T(C): 36.4 (11 Aug 2018 06:33), Max: 36.8 (10 Aug 2018 22:05)  T(F): 97.5 (11 Aug 2018 06:33), Max: 98.3 (10 Aug 2018 22:05)  HR: 65 (11 Aug 2018 11:51) (62 - 72)  BP: 112/63 (11 Aug 2018 11:51) (95/52 - 114/72)  BP(mean): --  RR: 16 (11 Aug 2018 11:51) (16 - 18)  SpO2: 98% (11 Aug 2018 11:51) (96% - 98%)    PHYSICAL EXAM:  Constitutional: NAD, well-groomed, well-developed  HEENT: PERRLA, EOMI, Normal Hearing, MMM  Neck: No LAD, No JVD  Back: Normal spine flexure, No CVA tenderness  Respiratory: CTAB Cardiovascular: S1 and S2, RRR, no M/G/R  Gastrointestinal: BS+, soft, NT/ND  Extremities: No peripheral edema  Vascular: 2+ peripheral pulses  Neurological: A/O x 3, no focal deficits  Psychiatric: Normal mood, normal affect  Musculoskeletal: 5/5 strength b/l upper and lower extremities  Skin: No rashes    CAPILLARY BLOOD GLUCOSE    LABS:                        12.2   5.4   )-----------( 197      ( 11 Aug 2018 05:50 )             36.7     08-11    139  |  101  |  10.0  ----------------------------<  109  3.8   |  28.0  |  0.66    Ca    8.9      11 Aug 2018 05:49  Phos  4.5     08-11  Mg     1.9     08-11    TPro  6.2<L>  /  Alb  3.5  /  TBili  1.2  /  DBili  0.2  /  AST  34<H>  /  ALT  30  /  AlkPhos  49  08-11    PT/INR - ( 09 Aug 2018 22:59 )   PT: 12.0 sec;   INR: 1.09 ratio               RADIOLOGY & ADDITIONAL TESTS:

## 2018-08-11 NOTE — DISCHARGE NOTE ADULT - PATIENT PORTAL LINK FT
You can access the [facility] Patient Portal, offered by [facility], by registering with the following website: [email or web address]

## 2018-08-11 NOTE — DISCHARGE NOTE ADULT - PLAN OF CARE
Stable respiration Patient advised not to exert herself in the summertime heat. Follow up with cardiology Monitor BP Follow up with Dr Barajas Target normal TSH

## 2018-08-11 NOTE — DISCHARGE NOTE ADULT - MEDICATION SUMMARY - MEDICATIONS TO TAKE
I will START or STAY ON the medications listed below when I get home from the hospital:    Lipitor 20 mg oral tablet  -- 1 tab(s) by mouth once a day (at bedtime)  -- Indication: For hld    losartan-hydrochlorothiazide 100mg-12.5mg oral tablet  -- 1 tab(s) by mouth once a day  -- Indication: For htn    Synthroid 100 mcg (0.1 mg) oral tablet  -- 1 tab(s) by mouth once a day  -- Indication: For hypothyroid

## 2018-08-11 NOTE — PROGRESS NOTE ADULT - SUBJECTIVE AND OBJECTIVE BOX
VINAYAK RUTH  [id number]        Chief Complaint: follow up SOB      Subjective: feeling better, no CP/SOB/palps. Walked wells without symtpoms      24 hour Tele: SR, no events        atorvastatin 20 milliGRAM(s) Oral at bedtime  enoxaparin Injectable 40 milliGRAM(s) SubCutaneous daily  hydrochlorothiazide 12.5 milliGRAM(s) Oral daily  levothyroxine 100 MICROGram(s) Oral daily  losartan 100 milliGRAM(s) Oral daily          Physical Exam:  T(C): 36.4 (08-11-18 @ 06:33), Max: 36.8 (08-10-18 @ 22:05)  HR: 65 (08-11-18 @ 11:51) (62 - 72)  BP: 112/63 (08-11-18 @ 11:51) (95/52 - 114/72)  RR: 16 (08-11-18 @ 11:51) (16 - 18)  SpO2: 98% (08-11-18 @ 11:51) (96% - 98%)  Wt(kg): --  General: Comfortable in NAD  HEENT: MMM, sclera anicteric  Resp: CTA bilaterally  CVS: nl s1s2, rrr, no s3/JVD  Abd: soft, NT, ND, BS+  Ext: No c/c/e  Neuro A&O x3  Psych: Normal affect    I&O's Summary    10 Aug 2018 07:01  -  11 Aug 2018 07:00  --------------------------------------------------------  IN: 0 mL / OUT: 200 mL / NET: -200 mL          Labs:   11 Aug 2018 05:49    139    |  101    |  10.0   ----------------------------<  109    3.8     |  28.0   |  0.66     Ca    8.9        11 Aug 2018 05:49  Phos  4.5       11 Aug 2018 05:49  Mg     1.9       11 Aug 2018 05:49    TPro  6.2    /  Alb  3.5    /  TBili  1.2    /  DBili  0.2    /  AST  34     /  ALT  30     /  AlkPhos  49     11 Aug 2018 05:49                          12.2   5.4   )-----------( 197      ( 11 Aug 2018 05:50 )             36.7     PT/INR - ( 09 Aug 2018 22:59 )   PT: 12.0 sec;   INR: 1.09 ratio           CARDIAC MARKERS ( 10 Aug 2018 18:34 )  x     / <0.01 ng/mL / x     / x     / x      CARDIAC MARKERS ( 10 Aug 2018 13:26 )  x     / <0.01 ng/mL / x     / x     / x      CARDIAC MARKERS ( 10 Aug 2018 09:21 )  x     / <0.01 ng/mL / x     / x     / x      CARDIAC MARKERS ( 09 Aug 2018 22:59 )  x     / <0.01 ng/mL / 100 U/L / x     / x          Assessment:  69 y/o female with a PMHx of HTN, HLD, thyroid ca (papillary) s/p thyroidectomy 9 years ago and recurrence in 2018 s/p removal of 3 LN from neck on June 22, 2018 (one of which was positive for cancer) presents with c/o shortness of breath progressing over last 2 weeks, mostly when out in the heat she states.    -Patient d/w her cardio who suggested further w/u with stress vs. cath.  Patient with no significant CP, trops negative, EKG nonischemic.  No symptoms at this time.  CTA negative for PE.  No evidence of CHF clinically despite elevated BNP. Echo unremarkable with preserved biventricular function and no evidence PHTN.       Plan:  1. Will plan on nuclear stress test prior to discharge, plan for monday  2. Continue statin and antihypertensives        Dionisio Rogel MD

## 2018-08-11 NOTE — DISCHARGE NOTE ADULT - CARE PLAN
Principal Discharge DX:	Exertional dyspnea  Goal:	Stable respiration  Assessment and plan of treatment:	Patient advised not to exert herself in the summertime heat.  Secondary Diagnosis:	Chronic diastolic heart failure  Goal:	Follow up with cardiology  Secondary Diagnosis:	Essential hypertension  Goal:	Monitor BP  Secondary Diagnosis:	History of thyroid cancer  Goal:	Follow up with Dr Barajas  Secondary Diagnosis:	Postoperative hypothyroidism  Goal:	Target normal TSH

## 2018-08-11 NOTE — PROGRESS NOTE ADULT - ASSESSMENT
68 year old female thyroid cancer, hypothyroid, htn, hyperlipidemia with shortness of breath status post recent resection of thyroid recurrence.  Stress test on Monday.

## 2018-08-12 PROCEDURE — 99232 SBSQ HOSP IP/OBS MODERATE 35: CPT

## 2018-08-12 RX ADMIN — Medication 650 MILLIGRAM(S): at 00:45

## 2018-08-12 RX ADMIN — Medication 100 MICROGRAM(S): at 07:00

## 2018-08-12 RX ADMIN — Medication 12.5 MILLIGRAM(S): at 09:26

## 2018-08-12 RX ADMIN — ATORVASTATIN CALCIUM 20 MILLIGRAM(S): 80 TABLET, FILM COATED ORAL at 22:56

## 2018-08-12 RX ADMIN — ENOXAPARIN SODIUM 40 MILLIGRAM(S): 100 INJECTION SUBCUTANEOUS at 22:56

## 2018-08-12 RX ADMIN — LOSARTAN POTASSIUM 100 MILLIGRAM(S): 100 TABLET, FILM COATED ORAL at 09:26

## 2018-08-12 NOTE — PROGRESS NOTE ADULT - SUBJECTIVE AND OBJECTIVE BOX
VINAYAK RUTH  [id number]        Chief Complaint: follow up SOB      Subjective: feeling better, no CP/SOB/palps. Walked [place] without symptoms again today        atorvastatin 20 milliGRAM(s) Oral at bedtime  enoxaparin Injectable 40 milliGRAM(s) SubCutaneous daily  hydrochlorothiazide 12.5 milliGRAM(s) Oral daily  levothyroxine 100 MICROGram(s) Oral daily  losartan 100 milliGRAM(s) Oral daily          Physical Exam:  T(C): 37.1 (08-12-18 @ 11:57), Max: 37.1 (08-12-18 @ 11:57)  HR: 70 (08-12-18 @ 11:57) (53 - 72)  BP: 98/52 (08-12-18 @ 11:57) (94/54 - 110/62)  RR: 18 (08-12-18 @ 11:57) (18 - 18)  SpO2: 100% (08-12-18 @ 11:57) (97% - 100%)  Wt(kg): --  General: Comfortable in NAD  HEENT: MMM, sclera anicteric  Resp: CTA bilaterally  CVS: nl s1s2, rrr, no s3/JVD  Abd: soft, NT, ND, BS+  Ext: No c/c/e  Neuro A&O x3  Psych: Normal affect    I&O's Summary        Labs:   11 Aug 2018 05:49    139    |  101    |  10.0   ----------------------------<  109    3.8     |  28.0   |  0.66     Ca    8.9        11 Aug 2018 05:49  Phos  4.5       11 Aug 2018 05:49  Mg     1.9       11 Aug 2018 05:49    TPro  6.2    /  Alb  3.5    /  TBili  1.2    /  DBili  0.2    /  AST  34     /  ALT  30     /  AlkPhos  49     11 Aug 2018 05:49                          12.2   5.4   )-----------( 197      ( 11 Aug 2018 05:50 )             36.7       CARDIAC MARKERS ( 10 Aug 2018 18:34 )  x     / <0.01 ng/mL / x     / x     / x      CARDIAC MARKERS ( 10 Aug 2018 13:26 )  x     / <0.01 ng/mL / x     / x     / x                Assessment:  69 y/o female with a PMHx of HTN, HLD, thyroid ca (papillary) s/p thyroidectomy 9 years ago and recurrence in 2018 s/p removal of 3 LN from neck on June 22, 2018 (one of which was positive for cancer) presents with c/o shortness of breath progressing over last 2 weeks, mostly when out in the heat she states.    -Patient d/w her cardio who suggested further w/u with stress vs. cath.  Patient with no significant CP, trops negative, EKG nonischemic.  No symptoms at this time.  CTA negative for PE.  No evidence of CHF clinically despite elevated BNP. Echo unremarkable with preserved biventricular function and no evidence PHTN.       Plan:  1. Nuclear stress test tomorrow, dc home if negative      Dionisio Rogel MD

## 2018-08-12 NOTE — PROGRESS NOTE ADULT - SUBJECTIVE AND OBJECTIVE BOX
VINAYAK RUTH     Chief Complaint: Patient is a 68y old  Female who presents with a chief complaint of SOB (11 Aug 2018 15:21)      PAST MEDICAL & SURGICAL HISTORY:  Hypothyroid  History of thyroid cancer  H/O heartburn  Hyperlipidemia  Hypertension  H/O thyroidectomy: 2011      HPI/OVERNIGHT EVENTS: Patient in no distress.    MEDICATIONS  (STANDING):  atorvastatin 20 milliGRAM(s) Oral at bedtime  enoxaparin Injectable 40 milliGRAM(s) SubCutaneous daily  hydrochlorothiazide 12.5 milliGRAM(s) Oral daily  levothyroxine 100 MICROGram(s) Oral daily  losartan 100 milliGRAM(s) Oral daily      Vital Signs Last 24 Hrs  T(C): 37.1 (12 Aug 2018 11:57), Max: 37.1 (12 Aug 2018 11:57)  T(F): 98.7 (12 Aug 2018 11:57), Max: 98.7 (12 Aug 2018 11:57)  HR: 70 (12 Aug 2018 11:57) (53 - 72)  BP: 98/52 (12 Aug 2018 11:57) (94/54 - 110/62)  BP(mean): --  RR: 18 (12 Aug 2018 11:57) (18 - 18)  SpO2: 100% (12 Aug 2018 11:57) (97% - 100%)    PHYSICAL EXAM:  Constitutional: NAD, well-groomed, well-developed  HEENT: PERRLA, EOMI, Normal Hearing, MMM  Neck: No LAD, No JVD  Back: Normal spine flexure, No CVA tenderness  Respiratory: CTAB Cardiovascular: S1 and S2, RRR, no M/G/R  Gastrointestinal: BS+, soft, NT/ND  Extremities: No peripheral edema  Vascular: 2+ peripheral pulses  Neurological: A/O x 3, no focal deficits  Psychiatric: Normal mood, normal affect  Musculoskeletal: 5/5 strength b/l upper and lower extremities  Skin: No rashes    CAPILLARY BLOOD GLUCOSE    LABS:                        12.2   5.4   )-----------( 197      ( 11 Aug 2018 05:50 )             36.7     08-11    139  |  101  |  10.0  ----------------------------<  109  3.8   |  28.0  |  0.66    Ca    8.9      11 Aug 2018 05:49  Phos  4.5     08-11  Mg     1.9     08-11    TPro  6.2<L>  /  Alb  3.5  /  TBili  1.2  /  DBili  0.2  /  AST  34<H>  /  ALT  30  /  AlkPhos  49  08-11          RADIOLOGY & ADDITIONAL TESTS:

## 2018-08-13 VITALS
HEART RATE: 74 BPM | DIASTOLIC BLOOD PRESSURE: 67 MMHG | TEMPERATURE: 98 F | OXYGEN SATURATION: 100 % | RESPIRATION RATE: 18 BRPM | SYSTOLIC BLOOD PRESSURE: 96 MMHG

## 2018-08-13 DIAGNOSIS — I50.32 CHRONIC DIASTOLIC (CONGESTIVE) HEART FAILURE: ICD-10-CM

## 2018-08-13 LAB
ANION GAP SERPL CALC-SCNC: 11 MMOL/L — SIGNIFICANT CHANGE UP (ref 5–17)
BUN SERPL-MCNC: 11 MG/DL — SIGNIFICANT CHANGE UP (ref 8–20)
CALCIUM SERPL-MCNC: 8.9 MG/DL — SIGNIFICANT CHANGE UP (ref 8.6–10.2)
CHLORIDE SERPL-SCNC: 102 MMOL/L — SIGNIFICANT CHANGE UP (ref 98–107)
CO2 SERPL-SCNC: 25 MMOL/L — SIGNIFICANT CHANGE UP (ref 22–29)
CREAT SERPL-MCNC: 0.62 MG/DL — SIGNIFICANT CHANGE UP (ref 0.5–1.3)
GLUCOSE SERPL-MCNC: 111 MG/DL — SIGNIFICANT CHANGE UP (ref 70–115)
HCT VFR BLD CALC: 36.2 % — LOW (ref 37–47)
HGB BLD-MCNC: 12.1 G/DL — SIGNIFICANT CHANGE UP (ref 12–16)
MAGNESIUM SERPL-MCNC: 1.8 MG/DL — SIGNIFICANT CHANGE UP (ref 1.6–2.6)
MCHC RBC-ENTMCNC: 33.3 PG — HIGH (ref 27–31)
MCHC RBC-ENTMCNC: 33.4 G/DL — SIGNIFICANT CHANGE UP (ref 32–36)
MCV RBC AUTO: 99.7 FL — HIGH (ref 81–99)
PHOSPHATE SERPL-MCNC: 4.5 MG/DL — SIGNIFICANT CHANGE UP (ref 2.4–4.7)
PLATELET # BLD AUTO: 198 K/UL — SIGNIFICANT CHANGE UP (ref 150–400)
POTASSIUM SERPL-MCNC: 3.8 MMOL/L — SIGNIFICANT CHANGE UP (ref 3.5–5.3)
POTASSIUM SERPL-SCNC: 3.8 MMOL/L — SIGNIFICANT CHANGE UP (ref 3.5–5.3)
RBC # BLD: 3.63 M/UL — LOW (ref 4.4–5.2)
RBC # FLD: 12.6 % — SIGNIFICANT CHANGE UP (ref 11–15.6)
SODIUM SERPL-SCNC: 138 MMOL/L — SIGNIFICANT CHANGE UP (ref 135–145)
WBC # BLD: 6.7 K/UL — SIGNIFICANT CHANGE UP (ref 4.8–10.8)
WBC # FLD AUTO: 6.7 K/UL — SIGNIFICANT CHANGE UP (ref 4.8–10.8)

## 2018-08-13 PROCEDURE — 78452 HT MUSCLE IMAGE SPECT MULT: CPT

## 2018-08-13 PROCEDURE — 99238 HOSP IP/OBS DSCHRG MGMT 30/<: CPT

## 2018-08-13 PROCEDURE — 71046 X-RAY EXAM CHEST 2 VIEWS: CPT

## 2018-08-13 PROCEDURE — 84100 ASSAY OF PHOSPHORUS: CPT

## 2018-08-13 PROCEDURE — 83690 ASSAY OF LIPASE: CPT

## 2018-08-13 PROCEDURE — 85027 COMPLETE CBC AUTOMATED: CPT

## 2018-08-13 PROCEDURE — A9500: CPT

## 2018-08-13 PROCEDURE — 83735 ASSAY OF MAGNESIUM: CPT

## 2018-08-13 PROCEDURE — 71275 CT ANGIOGRAPHY CHEST: CPT

## 2018-08-13 PROCEDURE — 83880 ASSAY OF NATRIURETIC PEPTIDE: CPT

## 2018-08-13 PROCEDURE — 93016 CV STRESS TEST SUPVJ ONLY: CPT

## 2018-08-13 PROCEDURE — 80076 HEPATIC FUNCTION PANEL: CPT

## 2018-08-13 PROCEDURE — 84484 ASSAY OF TROPONIN QUANT: CPT

## 2018-08-13 PROCEDURE — 93005 ELECTROCARDIOGRAM TRACING: CPT

## 2018-08-13 PROCEDURE — 82550 ASSAY OF CK (CPK): CPT

## 2018-08-13 PROCEDURE — 80053 COMPREHEN METABOLIC PANEL: CPT

## 2018-08-13 PROCEDURE — 93017 CV STRESS TEST TRACING ONLY: CPT

## 2018-08-13 PROCEDURE — 78452 HT MUSCLE IMAGE SPECT MULT: CPT | Mod: 26

## 2018-08-13 PROCEDURE — 85610 PROTHROMBIN TIME: CPT

## 2018-08-13 PROCEDURE — 93306 TTE W/DOPPLER COMPLETE: CPT

## 2018-08-13 PROCEDURE — 80048 BASIC METABOLIC PNL TOTAL CA: CPT

## 2018-08-13 PROCEDURE — 99285 EMERGENCY DEPT VISIT HI MDM: CPT

## 2018-08-13 PROCEDURE — 93018 CV STRESS TEST I&R ONLY: CPT

## 2018-08-13 PROCEDURE — 36415 COLL VENOUS BLD VENIPUNCTURE: CPT

## 2018-08-13 RX ADMIN — Medication 100 MICROGRAM(S): at 05:59

## 2018-08-13 NOTE — PROGRESS NOTE ADULT - SUBJECTIVE AND OBJECTIVE BOX
VINAYAKMIKEY RUTH     Chief Complaint: Patient is a 68y old  Female who presents with a chief complaint of SOB (11 Aug 2018 15:21)      PAST MEDICAL & SURGICAL HISTORY:  Hypothyroid  History of thyroid cancer  H/O heartburn  Hyperlipidemia  Hypertension  H/O thyroidectomy: 2011      HPI/OVERNIGHT EVENTS: Patient in no distress. Stress test negative, dc home.    MEDICATIONS  (STANDING):  atorvastatin 20 milliGRAM(s) Oral at bedtime  enoxaparin Injectable 40 milliGRAM(s) SubCutaneous daily  hydrochlorothiazide 12.5 milliGRAM(s) Oral daily  levothyroxine 100 MICROGram(s) Oral daily  losartan 100 milliGRAM(s) Oral daily      Vital Signs Last 24 Hrs  T(C): 36.7 (13 Aug 2018 05:56), Max: 37.3 (12 Aug 2018 17:16)  T(F): 98 (13 Aug 2018 05:56), Max: 99.2 (12 Aug 2018 17:16)  HR: 78 (13 Aug 2018 11:57) (67 - 99)  BP: 100/68 (13 Aug 2018 11:57) (96/58 - 108/72)  BP(mean): --  RR: 18 (13 Aug 2018 11:57) (18 - 18)  SpO2: 98% (13 Aug 2018 11:57) (98% - 99%)    PHYSICAL EXAM:  Constitutional: NAD, well-groomed, well-developed  HEENT: PERRLA, EOMI, Normal Hearing, MMM  Neck: No LAD, No JVD  Back: Normal spine flexure, No CVA tenderness  Respiratory: CTAB Cardiovascular: S1 and S2, RRR, no M/G/R  Gastrointestinal: BS+, soft, NT/ND  Extremities: No peripheral edema  Vascular: 2+ peripheral pulses  Neurological: A/O x 3, no focal deficits  Psychiatric: Normal mood, normal affect  Musculoskeletal: 5/5 strength b/l upper and lower extremities  Skin: No rashes    CAPILLARY BLOOD GLUCOSE    LABS:                        12.1   6.7   )-----------( 198      ( 13 Aug 2018 06:10 )             36.2     08-13    138  |  102  |  11.0  ----------------------------<  111  3.8   |  25.0  |  0.62    Ca    8.9      13 Aug 2018 06:10  Phos  4.5     08-13  Mg     1.8     08-13            RADIOLOGY & ADDITIONAL TESTS:

## 2018-08-13 NOTE — PROGRESS NOTE ADULT - PROBLEM SELECTOR PLAN 1
Pulmonary and cardiology input appreciated. Ct angio confirms no pulm emb. echo shows preserved ef with chronic diastolic heart failure

## 2018-08-13 NOTE — PROGRESS NOTE ADULT - ASSESSMENT
68 year old female thyroid cancer, hypothyroid, htn, hyperlipidemia with shortness of breath status post recent resection of thyroid recurrence.  Stress test negative, patient may go home.

## 2018-08-13 NOTE — PROGRESS NOTE ADULT - SUBJECTIVE AND OBJECTIVE BOX
VINAYAK RUTH  [id number]      Chief Complaint: follow up SOB      Subjective: feeling better, no CP/SOB/palps. Walked wells without symptoms again today      atorvastatin 20 milliGRAM(s) Oral at bedtime  enoxaparin Injectable 40 milliGRAM(s) SubCutaneous daily  hydrochlorothiazide 12.5 milliGRAM(s) Oral daily  levothyroxine 100 MICROGram(s) Oral daily  losartan 100 milliGRAM(s) Oral daily          Physical Exam:  T(C): 36.7 (08-13-18 @ 05:56), Max: 37.3 (08-12-18 @ 17:16)  HR: 99 (08-13-18 @ 08:20) (67 - 99)  BP: 108/72 (08-13-18 @ 08:20) (96/58 - 108/72)  RR: 18 (08-13-18 @ 08:20) (18 - 18)  SpO2: 99% (08-13-18 @ 08:20) (99% - 100%)  Wt(kg): --  HEENT: MMM, sclera anicteric  Resp: CTA bilaterally  CVS: nl s1s2, rrr, no s3/JVD  Abd: soft, NT, ND, BS+  Ext: No c/c/e  Neuro A&O x3  Psych: Normal affect      I&O's Summary    12 Aug 2018 07:01  -  13 Aug 2018 07:00  --------------------------------------------------------  IN: 180 mL / OUT: 200 mL / NET: -20 mL          Labs:   13 Aug 2018 06:10    138    |  102    |  11.0   ----------------------------<  111    3.8     |  25.0   |  0.62     Ca    8.9        13 Aug 2018 06:10  Phos  4.5       13 Aug 2018 06:10  Mg     1.8       13 Aug 2018 06:10                            12.1   6.7   )-----------( 198      ( 13 Aug 2018 06:10 )             36.2     NUCLEAR STRESS TEST (personally reviewed)  1) Negative for ischemia  2) EF > 70%  3) Good exercise tolerance, achieved 10METS, no ECG changes    Assessment:  69 y/o female with a PMHx of HTN, HLD, thyroid ca (papillary) s/p thyroidectomy 9 years ago and recurrence in 2018 s/p removal of 3 LN from neck on June 22, 2018 (one of which was positive for cancer) presents with c/o shortness of breath progressing over last 2 weeks, mostly when out in the heat she states.    -Patient d/w her cardio who suggested further w/u with stress vs. cath.  Patient with no significant CP, trops negative, EKG nonischemic.  No symptoms at this time.  CTA negative for PE.  No evidence of CHF clinically despite elevated BNP. Echo unremarkable with preserved biventricular function and no evidence PHTN.   -NUclear stress test negative for ischemia. No cardiac etiology to symptoms    Plan:  1. CV stable for discharge  2. Outpatient follow up with primary  3. Thanks!      Dionisio Rogel MD

## 2018-08-30 ENCOUNTER — OUTPATIENT (OUTPATIENT)
Dept: OUTPATIENT SERVICES | Facility: HOSPITAL | Age: 68
LOS: 1 days | End: 2018-08-30
Payer: MEDICARE

## 2018-08-30 DIAGNOSIS — Z00.8 ENCOUNTER FOR OTHER GENERAL EXAMINATION: ICD-10-CM

## 2018-08-30 DIAGNOSIS — E89.0 POSTPROCEDURAL HYPOTHYROIDISM: Chronic | ICD-10-CM

## 2018-08-30 LAB
T3 SERPL-MCNC: 94 NG/DL — SIGNIFICANT CHANGE UP (ref 80–200)
T4 AB SER-ACNC: 8.3 UG/DL — SIGNIFICANT CHANGE UP (ref 4.5–12)
TSH SERPL-MCNC: 0.22 UIU/ML — LOW (ref 0.27–4.2)

## 2018-08-30 PROCEDURE — 86800 THYROGLOBULIN ANTIBODY: CPT

## 2018-08-30 PROCEDURE — 36415 COLL VENOUS BLD VENIPUNCTURE: CPT

## 2018-08-30 PROCEDURE — 84436 ASSAY OF TOTAL THYROXINE: CPT

## 2018-08-30 PROCEDURE — 84432 ASSAY OF THYROGLOBULIN: CPT

## 2018-08-30 PROCEDURE — 84480 ASSAY TRIIODOTHYRONINE (T3): CPT

## 2018-08-30 PROCEDURE — 84443 ASSAY THYROID STIM HORMONE: CPT

## 2018-08-31 LAB
THYROGLOB AB FLD IA-ACNC: <20 IU/ML — SIGNIFICANT CHANGE UP (ref 0–40)
THYROGLOB AB SERPL-ACNC: <20 IU/ML — SIGNIFICANT CHANGE UP (ref 0–40)
THYROGLOB SERPL-MCNC: 25.1 NG/ML — SIGNIFICANT CHANGE UP (ref 1.6–59.9)

## 2018-09-24 ENCOUNTER — APPOINTMENT (OUTPATIENT)
Dept: NUCLEAR MEDICINE | Facility: HOSPITAL | Age: 68
End: 2018-09-24

## 2018-09-25 ENCOUNTER — APPOINTMENT (OUTPATIENT)
Dept: NUCLEAR MEDICINE | Facility: HOSPITAL | Age: 68
End: 2018-09-25

## 2018-09-26 ENCOUNTER — APPOINTMENT (OUTPATIENT)
Dept: NUCLEAR MEDICINE | Facility: HOSPITAL | Age: 68
End: 2018-09-26

## 2018-10-01 ENCOUNTER — OUTPATIENT (OUTPATIENT)
Dept: OUTPATIENT SERVICES | Facility: HOSPITAL | Age: 68
LOS: 1 days | End: 2018-10-01
Payer: MEDICARE

## 2018-10-01 ENCOUNTER — APPOINTMENT (OUTPATIENT)
Dept: NUCLEAR MEDICINE | Facility: HOSPITAL | Age: 68
End: 2018-10-01
Payer: MEDICARE

## 2018-10-01 DIAGNOSIS — E89.0 POSTPROCEDURAL HYPOTHYROIDISM: Chronic | ICD-10-CM

## 2018-10-01 DIAGNOSIS — C73 MALIGNANT NEOPLASM OF THYROID GLAND: ICD-10-CM

## 2018-10-01 PROCEDURE — 99214 OFFICE O/P EST MOD 30 MIN: CPT

## 2018-10-02 ENCOUNTER — APPOINTMENT (OUTPATIENT)
Dept: NUCLEAR MEDICINE | Facility: HOSPITAL | Age: 68
End: 2018-10-02

## 2018-10-03 ENCOUNTER — APPOINTMENT (OUTPATIENT)
Dept: NUCLEAR MEDICINE | Facility: HOSPITAL | Age: 68
End: 2018-10-03

## 2018-10-05 ENCOUNTER — APPOINTMENT (OUTPATIENT)
Dept: NUCLEAR MEDICINE | Facility: HOSPITAL | Age: 68
End: 2018-10-05

## 2018-10-05 PROCEDURE — 78020 THYROID MET UPTAKE: CPT | Mod: 26

## 2018-10-05 PROCEDURE — 78018 THYROID MET IMAGING BODY: CPT | Mod: 26

## 2018-10-05 PROCEDURE — 78020 THYROID MET UPTAKE: CPT

## 2018-10-05 PROCEDURE — 96372 THER/PROPH/DIAG INJ SC/IM: CPT

## 2018-10-05 PROCEDURE — A9528: CPT

## 2018-10-05 PROCEDURE — 78018 THYROID MET IMAGING BODY: CPT

## 2018-12-06 ENCOUNTER — FORM ENCOUNTER (OUTPATIENT)
Age: 68
End: 2018-12-06

## 2018-12-07 ENCOUNTER — APPOINTMENT (OUTPATIENT)
Dept: CT IMAGING | Facility: CLINIC | Age: 68
End: 2018-12-07
Payer: MEDICARE

## 2018-12-07 ENCOUNTER — MESSAGE (OUTPATIENT)
Age: 68
End: 2018-12-07

## 2018-12-07 ENCOUNTER — OUTPATIENT (OUTPATIENT)
Dept: OUTPATIENT SERVICES | Facility: HOSPITAL | Age: 68
LOS: 1 days | End: 2018-12-07
Payer: MEDICARE

## 2018-12-07 DIAGNOSIS — D49.7 NEOPLASM OF UNSPECIFIED BEHAVIOR OF ENDOCRINE GLANDS AND OTHER PARTS OF NERVOUS SYSTEM: ICD-10-CM

## 2018-12-07 DIAGNOSIS — E89.0 POSTPROCEDURAL HYPOTHYROIDISM: Chronic | ICD-10-CM

## 2018-12-07 DIAGNOSIS — R06.09 OTHER FORMS OF DYSPNEA: ICD-10-CM

## 2018-12-07 PROCEDURE — 71250 CT THORAX DX C-: CPT

## 2018-12-07 PROCEDURE — 71250 CT THORAX DX C-: CPT | Mod: 26

## 2018-12-13 ENCOUNTER — APPOINTMENT (OUTPATIENT)
Dept: PULMONOLOGY | Facility: CLINIC | Age: 68
End: 2018-12-13
Payer: MEDICARE

## 2018-12-13 VITALS
DIASTOLIC BLOOD PRESSURE: 66 MMHG | WEIGHT: 161 LBS | BODY MASS INDEX: 27.64 KG/M2 | OXYGEN SATURATION: 97 % | HEART RATE: 75 BPM | SYSTOLIC BLOOD PRESSURE: 100 MMHG

## 2018-12-13 PROCEDURE — 99214 OFFICE O/P EST MOD 30 MIN: CPT

## 2018-12-13 RX ORDER — TELMISARTAN AND HYDROCHLOROTHIAZIDE 80; 12.5 MG/1; MG/1
80-12.5 TABLET ORAL
Refills: 0 | Status: ACTIVE | COMMUNITY
Start: 2018-12-13

## 2018-12-13 RX ORDER — LOSARTAN POTASSIUM AND HYDROCHLOROTHIAZIDE 12.5; 1 MG/1; MG/1
100-12.5 TABLET ORAL
Refills: 0 | Status: DISCONTINUED | COMMUNITY
End: 2018-12-13

## 2019-01-14 ENCOUNTER — OUTPATIENT (OUTPATIENT)
Dept: OUTPATIENT SERVICES | Facility: HOSPITAL | Age: 69
LOS: 1 days | Discharge: HOME | End: 2019-01-14

## 2019-01-14 DIAGNOSIS — C73 MALIGNANT NEOPLASM OF THYROID GLAND: ICD-10-CM

## 2019-01-14 DIAGNOSIS — E89.0 POSTPROCEDURAL HYPOTHYROIDISM: Chronic | ICD-10-CM

## 2019-01-18 ENCOUNTER — RESULT REVIEW (OUTPATIENT)
Age: 69
End: 2019-01-18

## 2019-01-29 ENCOUNTER — APPOINTMENT (OUTPATIENT)
Dept: HEMATOLOGY ONCOLOGY | Facility: CLINIC | Age: 69
End: 2019-01-29
Payer: MEDICARE

## 2019-01-29 ENCOUNTER — RESULT REVIEW (OUTPATIENT)
Age: 69
End: 2019-01-29

## 2019-01-29 DIAGNOSIS — Z00.00 ENCOUNTER FOR GENERAL ADULT MEDICAL EXAMINATION W/OUT ABNORMAL FINDINGS: ICD-10-CM

## 2019-01-29 LAB
BASOPHILS # BLD AUTO: 0.1 K/UL — SIGNIFICANT CHANGE UP (ref 0–0.2)
BASOPHILS NFR BLD AUTO: 1.2 % — SIGNIFICANT CHANGE UP (ref 0–2)
EOSINOPHIL # BLD AUTO: 0.1 K/UL — SIGNIFICANT CHANGE UP (ref 0–0.5)
EOSINOPHIL NFR BLD AUTO: 0.8 % — SIGNIFICANT CHANGE UP (ref 0–6)
HCT VFR BLD CALC: 38.8 % — SIGNIFICANT CHANGE UP (ref 34.5–45)
HGB BLD-MCNC: 13.4 G/DL — SIGNIFICANT CHANGE UP (ref 11.5–15.5)
LYMPHOCYTES # BLD AUTO: 2.5 K/UL — SIGNIFICANT CHANGE UP (ref 1–3.3)
LYMPHOCYTES # BLD AUTO: 32.8 % — SIGNIFICANT CHANGE UP (ref 13–44)
MCHC RBC-ENTMCNC: 34.4 PG — HIGH (ref 27–34)
MCHC RBC-ENTMCNC: 34.5 G/DL — SIGNIFICANT CHANGE UP (ref 32–36)
MCV RBC AUTO: 99.8 FL — SIGNIFICANT CHANGE UP (ref 80–100)
MONOCYTES # BLD AUTO: 0.6 K/UL — SIGNIFICANT CHANGE UP (ref 0–0.9)
MONOCYTES NFR BLD AUTO: 7.4 % — SIGNIFICANT CHANGE UP (ref 2–14)
NEUTROPHILS # BLD AUTO: 4.4 K/UL — SIGNIFICANT CHANGE UP (ref 1.8–7.4)
NEUTROPHILS NFR BLD AUTO: 57.8 % — SIGNIFICANT CHANGE UP (ref 43–77)
PLATELET # BLD AUTO: 281 K/UL — SIGNIFICANT CHANGE UP (ref 150–400)
RBC # BLD: 3.88 M/UL — SIGNIFICANT CHANGE UP (ref 3.8–5.2)
RBC # FLD: 10.9 % — SIGNIFICANT CHANGE UP (ref 10.3–14.5)
WBC # BLD: 7.6 K/UL — SIGNIFICANT CHANGE UP (ref 3.8–10.5)
WBC # FLD AUTO: 7.6 K/UL — SIGNIFICANT CHANGE UP (ref 3.8–10.5)

## 2019-01-29 PROCEDURE — 99205 OFFICE O/P NEW HI 60 MIN: CPT

## 2019-02-04 LAB
ALBUMIN SERPL ELPH-MCNC: 4.6 G/DL
ALP BLD-CCNC: 56 U/L
ALT SERPL-CCNC: 49 U/L
ANION GAP SERPL CALC-SCNC: 15 MMOL/L
AST SERPL-CCNC: 54 U/L
BILIRUB SERPL-MCNC: 1.3 MG/DL
BUN SERPL-MCNC: 10 MG/DL
CALCIUM SERPL-MCNC: 9.6 MG/DL
CHLORIDE SERPL-SCNC: 99 MMOL/L
CO2 SERPL-SCNC: 26 MMOL/L
CREAT SERPL-MCNC: 0.83 MG/DL
GLUCOSE SERPL-MCNC: 92 MG/DL
HAV IGM SER QL: NONREACTIVE
HBV CORE IGM SER QL: NONREACTIVE
HBV SURFACE AG SER QL: NONREACTIVE
HCV AB SER QL: NONREACTIVE
HCV S/CO RATIO: 0.18 S/CO
POTASSIUM SERPL-SCNC: 4.2 MMOL/L
PROT SERPL-MCNC: 7.3 G/DL
SODIUM SERPL-SCNC: 140 MMOL/L
THYROGLOB AB SERPL-ACNC: <20 IU/ML
THYROGLOB SERPL-MCNC: 30.2 NG/ML
TSH SERPL-ACNC: 0.33 UIU/ML

## 2019-02-27 NOTE — CONSULT LETTER
[Dear  ___] : Dear  [unfilled], [Consult Letter:] : I had the pleasure of evaluating your patient, [unfilled]. [Please see my note below.] : Please see my note below. [Consult Closing:] : Thank you very much for allowing me to participate in the care of this patient.  If you have any questions, please do not hesitate to contact me. [Sincerely,] : Sincerely, [DrPhillip  ___] : Dr. PRESCOTT [FreeTextEntry2] : Dr. Ney Barajas [FreeTextEntry3] : Wild Scott MD\par \par

## 2019-02-27 NOTE — ASSESSMENT
[FreeTextEntry1] : 69 yo w with recurrent differentiated thyroid cancer, s/p resection of recurrent LN in neck\par Last 1-131 scan negative and hence, no recent JOLLY tx. 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 \par Will check labs\par Scans in 6 months\par OV after scans\par Endo referral

## 2019-02-27 NOTE — REASON FOR VISIT
[Initial Consultation] : an initial consultation [Family Member] : family member [FreeTextEntry2] : thyroid cancer

## 2019-02-27 NOTE — HISTORY OF PRESENT ILLNESS
[de-identified] : Ms. Nava is a pleasant 69 yo w with hx of papillary thyroid ca, s/p total thyroidectomy [id number] and LND in 2009 (multifocal, largest 2.5 cm with extra-thyroidal extension, 1/6 LN pos). This was followed by JOLLY (low dose per patient). She was being followed sporadically with CT scans till an US in early 2018 which showed parathyroidal LAD. Iodine scan showed no uptake in thyroid bed or anywhere else. PET/CT showed small cervical LN and sub-cm lung nodules. Pt underwent LN excsion on 6/22/18- this showed recurrent papillary thyroid cancer in left paratracheal LN. She was referred to nuclear med but was not recommended since there was no iodine uptake even at 48 hours on repeat I-131 scan.Thyrogen stimulated thyroglobulin was however elevated, last checked in October was 144. Subsequent unstimulated thyroglobulin levels are normal. CT chest in December showed stable sub-cm LN and lung nodules. \par Pt is asymptomatic. She is taking 100 mcg of Synthroid. She does not have an endocrinologist.

## 2019-06-12 ENCOUNTER — APPOINTMENT (OUTPATIENT)
Dept: PULMONOLOGY | Facility: CLINIC | Age: 69
End: 2019-06-12

## 2019-07-08 ENCOUNTER — FORM ENCOUNTER (OUTPATIENT)
Age: 69
End: 2019-07-08

## 2019-07-09 ENCOUNTER — OUTPATIENT (OUTPATIENT)
Dept: OUTPATIENT SERVICES | Facility: HOSPITAL | Age: 69
LOS: 1 days | End: 2019-07-09
Payer: MEDICARE

## 2019-07-09 ENCOUNTER — APPOINTMENT (OUTPATIENT)
Dept: CT IMAGING | Facility: CLINIC | Age: 69
End: 2019-07-09
Payer: MEDICARE

## 2019-07-09 DIAGNOSIS — C73 MALIGNANT NEOPLASM OF THYROID GLAND: ICD-10-CM

## 2019-07-09 DIAGNOSIS — E89.0 POSTPROCEDURAL HYPOTHYROIDISM: Chronic | ICD-10-CM

## 2019-07-09 PROCEDURE — 71260 CT THORAX DX C+: CPT

## 2019-07-09 PROCEDURE — 71260 CT THORAX DX C+: CPT | Mod: 26

## 2019-07-09 PROCEDURE — 70491 CT SOFT TISSUE NECK W/DYE: CPT

## 2019-07-09 PROCEDURE — 70491 CT SOFT TISSUE NECK W/DYE: CPT | Mod: 26

## 2019-07-09 PROCEDURE — 82565 ASSAY OF CREATININE: CPT

## 2019-07-22 ENCOUNTER — OUTPATIENT (OUTPATIENT)
Dept: OUTPATIENT SERVICES | Facility: HOSPITAL | Age: 69
LOS: 1 days | Discharge: HOME | End: 2019-07-22

## 2019-07-22 DIAGNOSIS — E89.0 POSTPROCEDURAL HYPOTHYROIDISM: Chronic | ICD-10-CM

## 2019-07-22 DIAGNOSIS — C73 MALIGNANT NEOPLASM OF THYROID GLAND: ICD-10-CM

## 2019-07-26 ENCOUNTER — APPOINTMENT (OUTPATIENT)
Dept: HEMATOLOGY ONCOLOGY | Facility: CLINIC | Age: 69
End: 2019-07-26
Payer: MEDICARE

## 2019-07-26 ENCOUNTER — RESULT REVIEW (OUTPATIENT)
Age: 69
End: 2019-07-26

## 2019-07-26 VITALS
OXYGEN SATURATION: 99 % | BODY MASS INDEX: 28.08 KG/M2 | TEMPERATURE: 97.9 F | WEIGHT: 163.58 LBS | DIASTOLIC BLOOD PRESSURE: 82 MMHG | SYSTOLIC BLOOD PRESSURE: 125 MMHG | RESPIRATION RATE: 16 BRPM | HEART RATE: 61 BPM

## 2019-07-26 LAB
BASOPHILS # BLD AUTO: 0.1 K/UL — SIGNIFICANT CHANGE UP (ref 0–0.2)
BASOPHILS NFR BLD AUTO: 1.4 % — SIGNIFICANT CHANGE UP (ref 0–2)
EOSINOPHIL # BLD AUTO: 0.1 K/UL — SIGNIFICANT CHANGE UP (ref 0–0.5)
EOSINOPHIL NFR BLD AUTO: 0.8 % — SIGNIFICANT CHANGE UP (ref 0–6)
HCT VFR BLD CALC: 38.5 % — SIGNIFICANT CHANGE UP (ref 34.5–45)
HGB BLD-MCNC: 13.3 G/DL — SIGNIFICANT CHANGE UP (ref 11.5–15.5)
LYMPHOCYTES # BLD AUTO: 2.3 K/UL — SIGNIFICANT CHANGE UP (ref 1–3.3)
LYMPHOCYTES # BLD AUTO: 33.3 % — SIGNIFICANT CHANGE UP (ref 13–44)
MCHC RBC-ENTMCNC: 34.4 G/DL — SIGNIFICANT CHANGE UP (ref 32–36)
MCHC RBC-ENTMCNC: 35.3 PG — HIGH (ref 27–34)
MCV RBC AUTO: 103 FL — HIGH (ref 80–100)
MONOCYTES # BLD AUTO: 0.6 K/UL — SIGNIFICANT CHANGE UP (ref 0–0.9)
MONOCYTES NFR BLD AUTO: 8.9 % — SIGNIFICANT CHANGE UP (ref 2–14)
NEUTROPHILS # BLD AUTO: 3.8 K/UL — SIGNIFICANT CHANGE UP (ref 1.8–7.4)
NEUTROPHILS NFR BLD AUTO: 55.6 % — SIGNIFICANT CHANGE UP (ref 43–77)
PLATELET # BLD AUTO: 241 K/UL — SIGNIFICANT CHANGE UP (ref 150–400)
RBC # BLD: 3.75 M/UL — LOW (ref 3.8–5.2)
RBC # FLD: 11 % — SIGNIFICANT CHANGE UP (ref 10.3–14.5)
WBC # BLD: 6.9 K/UL — SIGNIFICANT CHANGE UP (ref 3.8–10.5)
WBC # FLD AUTO: 6.9 K/UL — SIGNIFICANT CHANGE UP (ref 3.8–10.5)

## 2019-07-26 PROCEDURE — 99215 OFFICE O/P EST HI 40 MIN: CPT

## 2019-07-26 NOTE — ASSESSMENT
[FreeTextEntry1] : 67 yo w with recurrent differentiated thyroid cancer, s/p resection of recurrent LN in neck\par Last 1-131 scan negative and hence, no recent JOLLY tx. 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 \par Will check labs\par Scans in 6 months\par OV after scans\par Follow up with endocrine. Will make a referral.

## 2019-07-26 NOTE — HISTORY OF PRESENT ILLNESS
[Disease: _____________________] : Disease: [unfilled] [de-identified] : Ms. Nava is a pleasant 67 yo w with hx of papillary thyroid ca, s/p total thyroidectomy [id number] and LND in 2009 (multifocal, largest 2.5 cm with extra-thyroidal extension, 1/6 LN pos). This was followed by JOLLY (low dose per patient). She was being followed sporadically with CT scans till an US in early 2018 which showed parathyroidal LAD. Iodine scan showed no uptake in thyroid bed or anywhere else. PET/CT showed small cervical LN and sub-cm lung nodules. Pt underwent LN excsion on 6/22/18- this showed recurrent papillary thyroid cancer in left paratracheal LN. She was referred to nuclear med but was not recommended since there was no iodine uptake even at 48 hours on repeat I-131 scan.Thyrogen stimulated thyroglobulin was however elevated, last checked in October was 144. Subsequent unstimulated thyroglobulin levels are normal. CT chest in December showed stable sub-cm LN and lung nodules. \par Pt is asymptomatic. She is taking 100 mcg of Synthroid. She does not have an endocrinologist. \par \par 7/26/219: Pt continues on 100 mcg of Synthroid. She had CT scans done on 7/9 which show 2 small sub-cm nodes in the supraclav area and completely stable lung nodules. She remains asymptomatic [de-identified] : papillary thyroid ca [Date: ____________] : Patient's last distress assessment performed on [unfilled]. [0 - No Distress] : Distress Level: 0

## 2019-07-30 LAB
ALBUMIN SERPL ELPH-MCNC: 4.7 G/DL
ALP BLD-CCNC: 58 U/L
ALT SERPL-CCNC: 43 U/L
ANION GAP SERPL CALC-SCNC: 13 MMOL/L
AST SERPL-CCNC: 44 U/L
BILIRUB SERPL-MCNC: 1.2 MG/DL
BUN SERPL-MCNC: 8 MG/DL
CALCIUM SERPL-MCNC: 9.5 MG/DL
CHLORIDE SERPL-SCNC: 99 MMOL/L
CO2 SERPL-SCNC: 27 MMOL/L
CREAT SERPL-MCNC: 0.73 MG/DL
GLUCOSE SERPL-MCNC: 113 MG/DL
POTASSIUM SERPL-SCNC: 4.6 MMOL/L
PROT SERPL-MCNC: 7.2 G/DL
SODIUM SERPL-SCNC: 139 MMOL/L
THYROGLOB AB SERPL-ACNC: <20 IU/ML
THYROGLOB SERPL-MCNC: 45.3 NG/ML
TSH SERPL-ACNC: 0.92 UIU/ML

## 2019-08-23 ENCOUNTER — APPOINTMENT (OUTPATIENT)
Dept: ENDOCRINOLOGY | Facility: CLINIC | Age: 69
End: 2019-08-23

## 2020-01-05 ENCOUNTER — FORM ENCOUNTER (OUTPATIENT)
Age: 70
End: 2020-01-05

## 2020-01-06 ENCOUNTER — APPOINTMENT (OUTPATIENT)
Dept: CT IMAGING | Facility: CLINIC | Age: 70
End: 2020-01-06
Payer: MEDICARE

## 2020-01-06 ENCOUNTER — OUTPATIENT (OUTPATIENT)
Dept: OUTPATIENT SERVICES | Facility: HOSPITAL | Age: 70
LOS: 1 days | End: 2020-01-06
Payer: MEDICARE

## 2020-01-06 DIAGNOSIS — C73 MALIGNANT NEOPLASM OF THYROID GLAND: ICD-10-CM

## 2020-01-06 DIAGNOSIS — Z00.00 ENCOUNTER FOR GENERAL ADULT MEDICAL EXAMINATION WITHOUT ABNORMAL FINDINGS: ICD-10-CM

## 2020-01-06 DIAGNOSIS — E89.0 POSTPROCEDURAL HYPOTHYROIDISM: Chronic | ICD-10-CM

## 2020-01-06 PROCEDURE — 82565 ASSAY OF CREATININE: CPT

## 2020-01-06 PROCEDURE — 70491 CT SOFT TISSUE NECK W/DYE: CPT | Mod: 26

## 2020-01-06 PROCEDURE — 71260 CT THORAX DX C+: CPT

## 2020-01-06 PROCEDURE — 70491 CT SOFT TISSUE NECK W/DYE: CPT

## 2020-01-06 PROCEDURE — 71260 CT THORAX DX C+: CPT | Mod: 26

## 2020-01-23 ENCOUNTER — OUTPATIENT (OUTPATIENT)
Dept: OUTPATIENT SERVICES | Facility: HOSPITAL | Age: 70
LOS: 1 days | Discharge: HOME | End: 2020-01-23

## 2020-01-23 DIAGNOSIS — E89.0 POSTPROCEDURAL HYPOTHYROIDISM: Chronic | ICD-10-CM

## 2020-01-23 DIAGNOSIS — C73 MALIGNANT NEOPLASM OF THYROID GLAND: ICD-10-CM

## 2020-01-28 ENCOUNTER — APPOINTMENT (OUTPATIENT)
Dept: HEMATOLOGY ONCOLOGY | Facility: CLINIC | Age: 70
End: 2020-01-28
Payer: MEDICARE

## 2020-01-28 VITALS
WEIGHT: 169.76 LBS | HEART RATE: 83 BPM | BODY MASS INDEX: 29.14 KG/M2 | TEMPERATURE: 98.2 F | RESPIRATION RATE: 16 BRPM | DIASTOLIC BLOOD PRESSURE: 70 MMHG | OXYGEN SATURATION: 99 % | SYSTOLIC BLOOD PRESSURE: 130 MMHG

## 2020-01-28 PROCEDURE — 99214 OFFICE O/P EST MOD 30 MIN: CPT

## 2020-01-28 RX ORDER — LEVOTHYROXINE SODIUM 100 UG/1
100 CAPSULE ORAL DAILY
Qty: 30 | Refills: 0 | Status: ACTIVE | COMMUNITY
Start: 2020-01-28 | End: 1900-01-01

## 2020-01-28 NOTE — HISTORY OF PRESENT ILLNESS
[Disease: _____________________] : Disease: [unfilled] [de-identified] : Ms. Nava is a pleasant 67 yo w with hx of papillary thyroid ca, s/p total thyroidectomy [id number] and LND in 2009 (multifocal, largest 2.5 cm with extra-thyroidal extension, 1/6 LN pos). This was followed by JOLLY (low dose per patient). She was being followed sporadically with CT scans till an US in early 2018 which showed parathyroidal LAD. Iodine scan showed no uptake in thyroid bed or anywhere else. PET/CT showed small cervical LN and sub-cm lung nodules. Pt underwent LN excsion on 6/22/18- this showed recurrent papillary thyroid cancer in left paratracheal LN. She was referred to nuclear med but was not recommended since there was no iodine uptake even at 48 hours on repeat I-131 scan.Thyrogen stimulated thyroglobulin was however elevated, last checked in October was 144. Subsequent unstimulated thyroglobulin levels are normal. CT chest in December showed stable sub-cm LN and lung nodules. \par Pt is asymptomatic. She is taking 100 mcg of Synthroid. She does not have an endocrinologist. \par \par 7/26/219: Pt continues on 100 mcg of Synthroid. She had CT scans done on 7/9 which show 2 small sub-cm nodes in the supraclav area and completely stable lung nodules. She remains asymptomatic\par \par 1/28/20: Gaining weight. No other complaints.  [de-identified] : papillary thyroid ca [Date: ____________] : Patient's last distress assessment performed on [unfilled]. [0 - No Distress] : Distress Level: 0

## 2020-01-28 NOTE — CONSULT LETTER
[Dear  ___] : Dear  [unfilled], [Consult Letter:] : I had the pleasure of evaluating your patient, [unfilled]. [Please see my note below.] : Please see my note below. [Consult Closing:] : Thank you very much for allowing me to participate in the care of this patient.  If you have any questions, please do not hesitate to contact me. [Sincerely,] : Sincerely, [FreeTextEntry2] : Dr. Ney Barajas [FreeTextEntry3] : Wild Scott MD\par \par  [DrPhillip  ___] : Dr. PRESCOTT

## 2020-01-28 NOTE — ASSESSMENT
[FreeTextEntry1] : 69 yo w with recurrent differentiated thyroid cancer, s/p resection of recurrent LN in neck\par Last 1-131 scan negative and hence, no recent JOLLY tx. 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 Pa\par Scans in 6 months\par OV after scans\par

## 2020-07-31 ENCOUNTER — APPOINTMENT (OUTPATIENT)
Dept: CT IMAGING | Facility: CLINIC | Age: 70
End: 2020-07-31
Payer: MEDICARE

## 2020-07-31 ENCOUNTER — OUTPATIENT (OUTPATIENT)
Dept: OUTPATIENT SERVICES | Facility: HOSPITAL | Age: 70
LOS: 1 days | End: 2020-07-31
Payer: MEDICARE

## 2020-07-31 ENCOUNTER — RESULT REVIEW (OUTPATIENT)
Age: 70
End: 2020-07-31

## 2020-07-31 ENCOUNTER — OUTPATIENT (OUTPATIENT)
Dept: OUTPATIENT SERVICES | Facility: HOSPITAL | Age: 70
LOS: 1 days | Discharge: HOME | End: 2020-07-31

## 2020-07-31 DIAGNOSIS — C10.9 MALIGNANT NEOPLASM OF OROPHARYNX, UNSPECIFIED: ICD-10-CM

## 2020-07-31 DIAGNOSIS — E89.0 POSTPROCEDURAL HYPOTHYROIDISM: Chronic | ICD-10-CM

## 2020-07-31 DIAGNOSIS — C73 MALIGNANT NEOPLASM OF THYROID GLAND: ICD-10-CM

## 2020-07-31 DIAGNOSIS — Z00.00 ENCOUNTER FOR GENERAL ADULT MEDICAL EXAMINATION WITHOUT ABNORMAL FINDINGS: ICD-10-CM

## 2020-07-31 PROCEDURE — 71260 CT THORAX DX C+: CPT

## 2020-07-31 PROCEDURE — 70491 CT SOFT TISSUE NECK W/DYE: CPT | Mod: 26

## 2020-07-31 PROCEDURE — 71260 CT THORAX DX C+: CPT | Mod: 26

## 2020-07-31 PROCEDURE — 70491 CT SOFT TISSUE NECK W/DYE: CPT

## 2020-08-06 ENCOUNTER — APPOINTMENT (OUTPATIENT)
Dept: HEMATOLOGY ONCOLOGY | Facility: CLINIC | Age: 70
End: 2020-08-06
Payer: MEDICARE

## 2020-08-06 PROCEDURE — 99214 OFFICE O/P EST MOD 30 MIN: CPT | Mod: 95

## 2020-08-07 NOTE — CONSULT LETTER
[Dear  ___] : Dear  [unfilled], [Please see my note below.] : Please see my note below. [Consult Closing:] : Thank you very much for allowing me to participate in the care of this patient.  If you have any questions, please do not hesitate to contact me. [Sincerely,] : Sincerely, [Consult Letter:] : I had the pleasure of evaluating your patient, [unfilled]. [FreeTextEntry2] : Dr. Ney Barajas [FreeTextEntry3] : Wild Scott MD\par \par  [DrPhillip  ___] : Dr. PRESCOTT

## 2020-08-07 NOTE — ASSESSMENT
[FreeTextEntry1] : 69 yo w with recurrent differentiated thyroid cancer, s/p resection of recurrent LN in neck\par Last 1-131 scan negative and hence, no recent JOLLY tx. 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 Pa\par Labs at home\par Scans in 6 months\par OV after scans\par

## 2020-08-07 NOTE — HISTORY OF PRESENT ILLNESS
[Home] : at home, [unfilled] , at the time of the visit. [Medical Office: ([facility])___] : at the medical office located in  [Spouse] : spouse [Verbal consent obtained from patient] : the patient, [unfilled] [FreeTextEntry3] : J [FreeTextEntry4] : Justyn Patelwright [Disease: _____________________] : Disease: [unfilled] [de-identified] : Ms. Nava is a pleasant 69 yo w with hx of papillary thyroid ca, s/p total thyroidectomy [id number] and LND in 2009 (multifocal, largest 2.5 cm with extra-thyroidal extension, 1/6 LN pos). This was followed by JOLLY (low dose per patient). She was being followed sporadically with CT scans till an US in early 2018 which showed parathyroidal LAD. Iodine scan showed no uptake in thyroid bed or anywhere else. PET/CT showed small cervical LN and sub-cm lung nodules. Pt underwent LN excsion on 6/22/18- this showed recurrent papillary thyroid cancer in left paratracheal LN. She was referred to nuclear med but was not recommended since there was no iodine uptake even at 48 hours on repeat I-131 scan.Thyrogen stimulated thyroglobulin was however elevated, last checked in October was 144. Subsequent unstimulated thyroglobulin levels are normal. CT chest in December showed stable sub-cm LN and lung nodules. \par Pt is asymptomatic. She is taking 100 mcg of Synthroid. She does not have an endocrinologist. \par \par 7/26/219: Pt continues on 100 mcg of Synthroid. She had CT scans done on 7/9 which show 2 small sub-cm nodes in the supraclav area and completely stable lung nodules. She remains asymptomatic\par \par 1/28/20: Gaining weight. No other complaints. \par \par 8/6/20: No complaints. Feeling well. Had CT scans and is anxious for results.  [de-identified] : papillary thyroid ca [Date: ____________] : Patient's last distress assessment performed on [unfilled]. [0 - No Distress] : Distress Level: 0

## 2020-08-07 NOTE — PHYSICAL EXAM
[Fully active, able to carry on all pre-disease performance without restriction] : Status 0 - Fully active, able to carry on all pre-disease performance without restriction [Normal] : affect appropriate [de-identified] : breathing comfortably

## 2020-08-10 ENCOUNTER — LABORATORY RESULT (OUTPATIENT)
Age: 70
End: 2020-08-10

## 2021-02-08 ENCOUNTER — OUTPATIENT (OUTPATIENT)
Dept: OUTPATIENT SERVICES | Facility: HOSPITAL | Age: 71
LOS: 1 days | Discharge: HOME | End: 2021-02-08

## 2021-02-08 DIAGNOSIS — C73 MALIGNANT NEOPLASM OF THYROID GLAND: ICD-10-CM

## 2021-02-08 DIAGNOSIS — E89.0 POSTPROCEDURAL HYPOTHYROIDISM: Chronic | ICD-10-CM

## 2021-02-11 ENCOUNTER — APPOINTMENT (OUTPATIENT)
Dept: HEMATOLOGY ONCOLOGY | Facility: CLINIC | Age: 71
End: 2021-02-11
Payer: MEDICARE

## 2021-02-11 PROCEDURE — 99211 OFF/OP EST MAY X REQ PHY/QHP: CPT | Mod: 95

## 2021-02-11 NOTE — ASSESSMENT
[FreeTextEntry1] : 69 yo w with recurrent differentiated thyroid cancer, s/p resection of recurrent LN in neck\par Last 1-131 scan negative and hence, no recent JOLLY tx. 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 Pa. Had recent blood work \par US of neck done in October 2020 was normal\par OV in 5 months\par

## 2021-02-11 NOTE — HISTORY OF PRESENT ILLNESS
[Disease: _____________________] : Disease: [unfilled] [Date: ____________] : Patient's last distress assessment performed on [unfilled]. [0 - No Distress] : Distress Level: 0 [Home] : at home, [unfilled] , at the time of the visit. [Medical Office: ([facility])___] : at the medical office located in  [Spouse] : spouse [Verbal consent obtained from patient] : the patient, [unfilled] [de-identified] : Ms. Nava is a pleasant 69 yo w with hx of papillary thyroid ca, s/p total thyroidectomy [id number] and LND in 2009 (multifocal, largest 2.5 cm with extra-thyroidal extension, 1/6 LN pos). This was followed by JOLLY (low dose per patient). She was being followed sporadically with CT scans till an US in early 2018 which showed parathyroidal LAD. Iodine scan showed no uptake in thyroid bed or anywhere else. PET/CT showed small cervical LN and sub-cm lung nodules. Pt underwent LN excsion on 6/22/18- this showed recurrent papillary thyroid cancer in left paratracheal LN. She was referred to nuclear med but was not recommended since there was no iodine uptake even at 48 hours on repeat I-131 scan.Thyrogen stimulated thyroglobulin was however elevated, last checked in October was 144. Subsequent unstimulated thyroglobulin levels are normal. CT chest in December showed stable sub-cm LN and lung nodules. \par Pt is asymptomatic. She is taking 100 mcg of Synthroid. She does not have an endocrinologist. \par \par 7/26/219: Pt continues on 100 mcg of Synthroid. She had CT scans done on 7/9 which show 2 small sub-cm nodes in the supraclav area and completely stable lung nodules. She remains asymptomatic\par \par 1/28/20: Gaining weight. No other complaints. \par \par 8/6/20: No complaints. Feeling well. Had CT scans and is anxious for results. \par \par 2/11/21: NO complaints. Doing well.  [de-identified] : papillary thyroid ca [FreeTextEntry4] : Justyn Patelwright

## 2021-02-11 NOTE — PHYSICAL EXAM
[Fully active, able to carry on all pre-disease performance without restriction] : Status 0 - Fully active, able to carry on all pre-disease performance without restriction [Normal] : affect appropriate [de-identified] : breathing comfortably

## 2021-02-15 NOTE — PATIENT PROFILE ADULT. - PRO SERVICES AT DISCH
ED Time Seen By Provider Entered On:  6/5/2019 16:14     Performed On:  6/5/2019 16:14  by lApesh Shea MD               Time Seen By Provider   Time Seen by Provider :   6/5/2019 16:14    Alpesh Shea MD - 6/5/2019 16:14                Electronically Signed On 06.05.2019 16:14  ___________________________________________________   Alpesh Shea MD     none

## 2021-08-10 ENCOUNTER — OUTPATIENT (OUTPATIENT)
Dept: OUTPATIENT SERVICES | Facility: HOSPITAL | Age: 71
LOS: 1 days | End: 2021-08-10
Payer: MEDICARE

## 2021-08-10 ENCOUNTER — APPOINTMENT (OUTPATIENT)
Dept: CT IMAGING | Facility: CLINIC | Age: 71
End: 2021-08-10
Payer: MEDICARE

## 2021-08-10 DIAGNOSIS — E89.0 POSTPROCEDURAL HYPOTHYROIDISM: Chronic | ICD-10-CM

## 2021-08-10 DIAGNOSIS — R91.8 OTHER NONSPECIFIC ABNORMAL FINDING OF LUNG FIELD: ICD-10-CM

## 2021-08-10 PROCEDURE — 71260 CT THORAX DX C+: CPT | Mod: 26,MH

## 2021-08-10 PROCEDURE — 82565 ASSAY OF CREATININE: CPT

## 2021-08-10 PROCEDURE — 70491 CT SOFT TISSUE NECK W/DYE: CPT | Mod: 26,MH

## 2021-08-10 PROCEDURE — 70491 CT SOFT TISSUE NECK W/DYE: CPT

## 2021-08-10 PROCEDURE — 71260 CT THORAX DX C+: CPT

## 2021-08-24 ENCOUNTER — EMERGENCY (EMERGENCY)
Facility: HOSPITAL | Age: 71
LOS: 1 days | Discharge: HOME | End: 2021-08-24
Attending: EMERGENCY MEDICINE
Payer: MEDICARE

## 2021-08-24 VITALS
WEIGHT: 156.09 LBS | DIASTOLIC BLOOD PRESSURE: 75 MMHG | RESPIRATION RATE: 22 BRPM | SYSTOLIC BLOOD PRESSURE: 112 MMHG | HEART RATE: 75 BPM | TEMPERATURE: 98 F | HEIGHT: 65 IN | OXYGEN SATURATION: 97 %

## 2021-08-24 DIAGNOSIS — E89.0 POSTPROCEDURAL HYPOTHYROIDISM: Chronic | ICD-10-CM

## 2021-08-24 LAB
ALBUMIN SERPL ELPH-MCNC: 4.1 G/DL — SIGNIFICANT CHANGE UP (ref 3.3–5.2)
ALP SERPL-CCNC: 76 U/L — SIGNIFICANT CHANGE UP (ref 40–120)
ALT FLD-CCNC: 49 U/L — HIGH
ANION GAP SERPL CALC-SCNC: 16 MMOL/L — SIGNIFICANT CHANGE UP (ref 5–17)
ANISOCYTOSIS BLD QL: SLIGHT — SIGNIFICANT CHANGE UP
AST SERPL-CCNC: 74 U/L — HIGH
BASOPHILS # BLD AUTO: 0.07 K/UL — SIGNIFICANT CHANGE UP (ref 0–0.2)
BASOPHILS NFR BLD AUTO: 0.8 % — SIGNIFICANT CHANGE UP (ref 0–2)
BILIRUB SERPL-MCNC: 0.8 MG/DL — SIGNIFICANT CHANGE UP (ref 0.4–2)
BUN SERPL-MCNC: 7.6 MG/DL — LOW (ref 8–20)
CALCIUM SERPL-MCNC: 9.2 MG/DL — SIGNIFICANT CHANGE UP (ref 8.6–10.2)
CHLORIDE SERPL-SCNC: 96 MMOL/L — LOW (ref 98–107)
CO2 SERPL-SCNC: 21 MMOL/L — LOW (ref 22–29)
CREAT SERPL-MCNC: 0.59 MG/DL — SIGNIFICANT CHANGE UP (ref 0.5–1.3)
D DIMER BLD IA.RAPID-MCNC: <150 NG/ML DDU — SIGNIFICANT CHANGE UP
EOSINOPHIL # BLD AUTO: 0.05 K/UL — SIGNIFICANT CHANGE UP (ref 0–0.5)
EOSINOPHIL NFR BLD AUTO: 0.6 % — SIGNIFICANT CHANGE UP (ref 0–6)
GLUCOSE SERPL-MCNC: 95 MG/DL — SIGNIFICANT CHANGE UP (ref 70–99)
HCT VFR BLD CALC: 32.7 % — LOW (ref 34.5–45)
HGB BLD-MCNC: 12 G/DL — SIGNIFICANT CHANGE UP (ref 11.5–15.5)
IMM GRANULOCYTES NFR BLD AUTO: 0.2 % — SIGNIFICANT CHANGE UP (ref 0–1.5)
LYMPHOCYTES # BLD AUTO: 2.96 K/UL — SIGNIFICANT CHANGE UP (ref 1–3.3)
LYMPHOCYTES # BLD AUTO: 35 % — SIGNIFICANT CHANGE UP (ref 13–44)
MACROCYTES BLD QL: SLIGHT — SIGNIFICANT CHANGE UP
MANUAL SMEAR VERIFICATION: SIGNIFICANT CHANGE UP
MCHC RBC-ENTMCNC: 36.7 GM/DL — HIGH (ref 32–36)
MCHC RBC-ENTMCNC: 38.7 PG — HIGH (ref 27–34)
MCV RBC AUTO: 105.5 FL — HIGH (ref 80–100)
MONOCYTES # BLD AUTO: 0.69 K/UL — SIGNIFICANT CHANGE UP (ref 0–0.9)
MONOCYTES NFR BLD AUTO: 8.2 % — SIGNIFICANT CHANGE UP (ref 2–14)
NEUTROPHILS # BLD AUTO: 4.66 K/UL — SIGNIFICANT CHANGE UP (ref 1.8–7.4)
NEUTROPHILS NFR BLD AUTO: 55.2 % — SIGNIFICANT CHANGE UP (ref 43–77)
NT-PROBNP SERPL-SCNC: 83 PG/ML — SIGNIFICANT CHANGE UP (ref 0–300)
OVALOCYTES BLD QL SMEAR: SLIGHT — SIGNIFICANT CHANGE UP
PLAT MORPH BLD: NORMAL — SIGNIFICANT CHANGE UP
PLATELET # BLD AUTO: 230 K/UL — SIGNIFICANT CHANGE UP (ref 150–400)
POIKILOCYTOSIS BLD QL AUTO: SLIGHT — SIGNIFICANT CHANGE UP
POTASSIUM SERPL-MCNC: 4 MMOL/L — SIGNIFICANT CHANGE UP (ref 3.5–5.3)
POTASSIUM SERPL-SCNC: 4 MMOL/L — SIGNIFICANT CHANGE UP (ref 3.5–5.3)
PROT SERPL-MCNC: 7.4 G/DL — SIGNIFICANT CHANGE UP (ref 6.6–8.7)
RBC # BLD: 3.1 M/UL — LOW (ref 3.8–5.2)
RBC # FLD: 12 % — SIGNIFICANT CHANGE UP (ref 10.3–14.5)
RBC BLD AUTO: SIGNIFICANT CHANGE UP
SODIUM SERPL-SCNC: 133 MMOL/L — LOW (ref 135–145)
TROPONIN T SERPL-MCNC: <0.01 NG/ML — SIGNIFICANT CHANGE UP (ref 0–0.06)
WBC # BLD: 8.45 K/UL — SIGNIFICANT CHANGE UP (ref 3.8–10.5)
WBC # FLD AUTO: 8.45 K/UL — SIGNIFICANT CHANGE UP (ref 3.8–10.5)

## 2021-08-24 PROCEDURE — 99284 EMERGENCY DEPT VISIT MOD MDM: CPT

## 2021-08-24 PROCEDURE — 71045 X-RAY EXAM CHEST 1 VIEW: CPT | Mod: 26

## 2021-08-24 PROCEDURE — 36415 COLL VENOUS BLD VENIPUNCTURE: CPT

## 2021-08-24 PROCEDURE — 93010 ELECTROCARDIOGRAM REPORT: CPT

## 2021-08-24 PROCEDURE — 85379 FIBRIN DEGRADATION QUANT: CPT

## 2021-08-24 PROCEDURE — 71045 X-RAY EXAM CHEST 1 VIEW: CPT

## 2021-08-24 PROCEDURE — 85025 COMPLETE CBC W/AUTO DIFF WBC: CPT

## 2021-08-24 PROCEDURE — 84484 ASSAY OF TROPONIN QUANT: CPT

## 2021-08-24 PROCEDURE — 93005 ELECTROCARDIOGRAM TRACING: CPT

## 2021-08-24 PROCEDURE — 80053 COMPREHEN METABOLIC PANEL: CPT

## 2021-08-24 PROCEDURE — 83880 ASSAY OF NATRIURETIC PEPTIDE: CPT

## 2021-08-24 PROCEDURE — 99284 EMERGENCY DEPT VISIT MOD MDM: CPT | Mod: 25

## 2021-08-24 NOTE — ED PROVIDER NOTE - NSFOLLOWUPINSTRUCTIONS_ED_ALL_ED_FT
follow up with cardiology in 3 - 5 days  follow up with primary care doctor in 1 - 3 days  continue present medications

## 2021-08-24 NOTE — ED ADULT NURSE NOTE - NSICDXFAMILYHX_GEN_ALL_CORE_FT
FAMILY HISTORY:  Father  Still living? Unknown  Family history of COPD (chronic obstructive pulmonary disease), Age at diagnosis: Age Unknown    Mother  Still living? Unknown  Family history of brain cancer, Age at diagnosis: Age Unknown  Family history of hyperlipidemia, Age at diagnosis: Age Unknown  Family history of hypertension, Age at diagnosis: Age Unknown

## 2021-08-24 NOTE — ED ADULT NURSE NOTE - NSICDXPASTMEDICALHX_GEN_ALL_CORE_FT
PAST MEDICAL HISTORY:  H/O heartburn     History of thyroid cancer     Hyperlipidemia     Hypertension     Hypothyroid

## 2021-08-24 NOTE — ED PROVIDER NOTE - PATIENT PORTAL LINK FT
You can access the FollowMyHealth Patient Portal offered by [facility] by registering at the following website: [email or web address]. By joining [employer]’s FollowMyHealth portal, you will also be able to view your health information using other applications (apps) compatible with our system.

## 2021-08-24 NOTE — ED PROVIDER NOTE - OBJECTIVE STATEMENT
70 yo  female comes to ed with  3 week history of shortness of breath on exertion; pt noted when she walks her dog she gets winded; denies any chest pain, sick contacts, nausea or vomting

## 2021-08-24 NOTE — ED PROVIDER NOTE - PHYSICAL EXAMINATION
Alert, lucid, and in no apparent distress. Pt is normocephalic, atraumatic.  Pupils are equal, round, lips pink, moist mucous membranes, tongue midline. Neck supple.   Lungs clear to auscultation. Heart regular rate and rhythm, normal S1, S2,   Abdomen is soft, nontender, no pulsatile mass, no masses, no distension, no rebound. No CVA Tenderness,.   Non-focal sensory, 5 out of 5 motor strength. Skin without rash,   No submandibular adenopathy. Normal mentation, does not appear agitated

## 2021-09-11 ENCOUNTER — OUTPATIENT (OUTPATIENT)
Dept: OUTPATIENT SERVICES | Facility: HOSPITAL | Age: 71
LOS: 1 days | Discharge: HOME | End: 2021-09-11

## 2021-09-11 DIAGNOSIS — C73 MALIGNANT NEOPLASM OF THYROID GLAND: ICD-10-CM

## 2021-09-11 DIAGNOSIS — E89.0 POSTPROCEDURAL HYPOTHYROIDISM: Chronic | ICD-10-CM

## 2021-09-14 ENCOUNTER — RESULT REVIEW (OUTPATIENT)
Age: 71
End: 2021-09-14

## 2021-09-14 ENCOUNTER — APPOINTMENT (OUTPATIENT)
Dept: HEMATOLOGY ONCOLOGY | Facility: CLINIC | Age: 71
End: 2021-09-14
Payer: MEDICARE

## 2021-09-14 ENCOUNTER — LABORATORY RESULT (OUTPATIENT)
Age: 71
End: 2021-09-14

## 2021-09-14 VITALS
HEART RATE: 86 BPM | WEIGHT: 158.73 LBS | OXYGEN SATURATION: 98 % | RESPIRATION RATE: 16 BRPM | DIASTOLIC BLOOD PRESSURE: 84 MMHG | SYSTOLIC BLOOD PRESSURE: 146 MMHG | TEMPERATURE: 98 F | BODY MASS INDEX: 27.25 KG/M2

## 2021-09-14 LAB
BASOPHILS # BLD AUTO: 0.07 K/UL — SIGNIFICANT CHANGE UP (ref 0–0.2)
BASOPHILS NFR BLD AUTO: 0.7 % — SIGNIFICANT CHANGE UP (ref 0–2)
EOSINOPHIL # BLD AUTO: 0.02 K/UL — SIGNIFICANT CHANGE UP (ref 0–0.5)
EOSINOPHIL NFR BLD AUTO: 0.2 % — SIGNIFICANT CHANGE UP (ref 0–6)
HCT VFR BLD CALC: 36.8 % — SIGNIFICANT CHANGE UP (ref 34.5–45)
HGB BLD-MCNC: 12.9 G/DL — SIGNIFICANT CHANGE UP (ref 11.5–15.5)
IMM GRANULOCYTES NFR BLD AUTO: 0.4 % — SIGNIFICANT CHANGE UP (ref 0–1.5)
LYMPHOCYTES # BLD AUTO: 2.2 K/UL — SIGNIFICANT CHANGE UP (ref 1–3.3)
LYMPHOCYTES # BLD AUTO: 23.5 % — SIGNIFICANT CHANGE UP (ref 13–44)
MCHC RBC-ENTMCNC: 35.1 G/DL — SIGNIFICANT CHANGE UP (ref 32–36)
MCHC RBC-ENTMCNC: 36.2 PG — HIGH (ref 27–34)
MCV RBC AUTO: 103.4 FL — HIGH (ref 80–100)
MONOCYTES # BLD AUTO: 0.69 K/UL — SIGNIFICANT CHANGE UP (ref 0–0.9)
MONOCYTES NFR BLD AUTO: 7.4 % — SIGNIFICANT CHANGE UP (ref 2–14)
NEUTROPHILS # BLD AUTO: 6.36 K/UL — SIGNIFICANT CHANGE UP (ref 1.8–7.4)
NEUTROPHILS NFR BLD AUTO: 67.8 % — SIGNIFICANT CHANGE UP (ref 43–77)
NRBC # BLD: 0 /100 WBCS — SIGNIFICANT CHANGE UP (ref 0–0)
PLATELET # BLD AUTO: 237 K/UL — SIGNIFICANT CHANGE UP (ref 150–400)
RBC # BLD: 3.56 M/UL — LOW (ref 3.8–5.2)
RBC # FLD: 11.8 % — SIGNIFICANT CHANGE UP (ref 10.3–14.5)
WBC # BLD: 9.38 K/UL — SIGNIFICANT CHANGE UP (ref 3.8–10.5)
WBC # FLD AUTO: 9.38 K/UL — SIGNIFICANT CHANGE UP (ref 3.8–10.5)

## 2021-09-14 PROCEDURE — 99215 OFFICE O/P EST HI 40 MIN: CPT

## 2021-09-15 NOTE — PHYSICAL EXAM
[Fully active, able to carry on all pre-disease performance without restriction] : Status 0 - Fully active, able to carry on all pre-disease performance without restriction [Normal] : affect appropriate [de-identified] : breathing comfortably

## 2021-09-15 NOTE — ASSESSMENT
[FreeTextEntry1] : 72 yo w with recurrent differentiated thyroid cancer, s/p resection of recurrent LN in neck\par Last 1-131 scan negative and hence, no recent JOLLY tx.  Prior CT scans have shown stable lung nodules. 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 \par \par # Differentiated thyroid cancer\par - CT scan from 8/10 is showing: A right upper lobe ground glass opacity mildly larger from prior CT (8/10/2018) measuring 1.2 x 1.1 cm as compared with 1 x1 cm. The rest of the lung nodules are stable\par - Recommend getting evaluated by CT surgery for possible wedge resection of this lesion since it has been gradually increasing in size and may represent a separate primary lung neoplasm. \par - If it is decided not to proceed with resection, we have ordered 3 month repeat scans\par - Otherwise, given lack of symptoms, stable findings and minimal disease burden, would continue to monitor without any tx.\par - Follow up with endocrine\par - Repeat labs today\par - RV in 3 months\par \par \par Case d/w Dr. Scott\par \par \par \par Addendum: case discussed at [facility] 9/15/21: \par Reviewed images in detail \par Rt sided GGO very slowly growing. No solid component. Watchful waiting may be appropriate as is sampling/wedge resection. Dominant solid left sided nodule in central- likely met but never been sampled. This may be possibly accessed by natalie bronch or CT guidance to ensure it is related to thyroid and not separate lung primary. If latter, SBRT may also be an option as would be surgical resection. Multiple other smaller nodules likely metastatic have been stable over several months/years. \par \par \par \par \par \par

## 2021-09-15 NOTE — HISTORY OF PRESENT ILLNESS
[Disease: _____________________] : Disease: [unfilled] [Date: ____________] : Patient's last distress assessment performed on [unfilled]. [0 - No Distress] : Distress Level: 0 [Home] : at home, [unfilled] , at the time of the visit. [Medical Office: ([facility])___] : at the medical office located in  [Spouse] : spouse [Verbal consent obtained from patient] : the patient, [unfilled] [FreeTextEntry4] : Justyn Patelwright [de-identified] : Ms. Nava is a pleasant 69 yo w with hx of papillary thyroid ca, s/p total thyroidectomy [id number] and LND in 2009 (multifocal, largest 2.5 cm with extra-thyroidal extension, 1/6 LN pos). This was followed by JOLLY (low dose per patient). She was being followed sporadically with CT scans till an US in early 2018 which showed parathyroidal LAD. Iodine scan showed no uptake in thyroid bed or anywhere else. PET/CT showed small cervical LN and sub-cm lung nodules. Pt underwent LN excsion on 6/22/18- this showed recurrent papillary thyroid cancer in left paratracheal LN. She was referred to nuclear med but was not recommended since there was no iodine uptake even at 48 hours on repeat I-131 scan.Thyrogen stimulated thyroglobulin was however elevated, last checked in October was 144. Subsequent unstimulated thyroglobulin levels are normal. CT chest in December showed stable sub-cm LN and lung nodules. \par Pt is asymptomatic. She is taking 100 mcg of Synthroid. She does not have an endocrinologist. \par \par 7/26/219: Pt continues on 100 mcg of Synthroid. She had CT scans done on 7/9 which show 2 small sub-cm nodes in the supraclav area and completely stable lung nodules. She remains asymptomatic\par \par 1/28/20: Gaining weight. No other complaints. \par \par 8/6/20: No complaints. Feeling well. Had CT scans and is anxious for results. \par \par 2/11/21: NO complaints. Doing well.  [de-identified] : papillary thyroid ca [de-identified] : 9/14/21: No complains. Doing well.

## 2021-09-15 NOTE — CONSULT LETTER
[Please see my note below.] : Please see my note below. [Consult Closing:] : Thank you very much for allowing me to participate in the care of this patient.  If you have any questions, please do not hesitate to contact me. [Sincerely,] : Sincerely, [DrPhillip  ___] : Dr. PRESCOTT [FreeTextEntry3] : Wild Scott MD\par \par

## 2021-09-17 LAB
ALBUMIN SERPL ELPH-MCNC: 4.7 G/DL
ALP BLD-CCNC: 81 U/L
ALT SERPL-CCNC: 52 U/L
ANION GAP SERPL CALC-SCNC: 14 MMOL/L
AST SERPL-CCNC: 79 U/L
BILIRUB SERPL-MCNC: 1.3 MG/DL
BUN SERPL-MCNC: 8 MG/DL
CALCIUM SERPL-MCNC: 9.6 MG/DL
CEA SERPL-MCNC: 1.8 NG/ML
CHLORIDE SERPL-SCNC: 97 MMOL/L
CO2 SERPL-SCNC: 25 MMOL/L
CREAT SERPL-MCNC: 0.68 MG/DL
GLUCOSE SERPL-MCNC: 120 MG/DL
POTASSIUM SERPL-SCNC: 4.5 MMOL/L
PROT SERPL-MCNC: 7.6 G/DL
SODIUM SERPL-SCNC: 136 MMOL/L
THYROGLOB AB SERPL-ACNC: <20 IU/ML
THYROGLOB SERPL-MCNC: 97.8 NG/ML
TSH SERPL-ACNC: 0.14 UIU/ML

## 2021-09-21 ENCOUNTER — NON-APPOINTMENT (OUTPATIENT)
Age: 71
End: 2021-09-21

## 2021-09-21 ENCOUNTER — APPOINTMENT (OUTPATIENT)
Dept: THORACIC SURGERY | Facility: CLINIC | Age: 71
End: 2021-09-21
Payer: MEDICARE

## 2021-09-21 VITALS
HEART RATE: 85 BPM | BODY MASS INDEX: 25.99 KG/M2 | TEMPERATURE: 99.1 F | RESPIRATION RATE: 18 BRPM | DIASTOLIC BLOOD PRESSURE: 66 MMHG | SYSTOLIC BLOOD PRESSURE: 119 MMHG | OXYGEN SATURATION: 99 % | WEIGHT: 156 LBS | HEIGHT: 65 IN

## 2021-09-21 PROCEDURE — 99205 OFFICE O/P NEW HI 60 MIN: CPT

## 2021-09-21 NOTE — PHYSICAL EXAM
[General Appearance - In No Acute Distress] : in no acute distress [General Appearance - Alert] : alert [Sclera] : the sclera and conjunctiva were normal [PERRL With Normal Accommodation] : pupils were equal in size, round, and reactive to light [Extraocular Movements] : extraocular movements were intact [Outer Ear] : the ears and nose were normal in appearance [Oropharynx] : the oropharynx was normal [Neck Appearance] : the appearance of the neck was normal [Neck Cervical Mass (___cm)] : no neck mass was observed [Jugular Venous Distention Increased] : there was no jugular-venous distention [Thyroid Diffuse Enlargement] : the thyroid was not enlarged [Thyroid Nodule] : there were no palpable thyroid nodules [Auscultation Breath Sounds / Voice Sounds] : lungs were clear to auscultation bilaterally [Heart Rate And Rhythm] : heart rate was normal and rhythm regular [Heart Sounds] : normal S1 and S2 [Heart Sounds Gallop] : no gallops [Murmurs] : no murmurs [Heart Sounds Pericardial Friction Rub] : no pericardial rub [Examination Of The Chest] : the chest was normal in appearance [Chest Visual Inspection Thoracic Asymmetry] : no chest asymmetry [Diminished Respiratory Excursion] : normal chest expansion [2+] : left 2+ [No Abnormalities] : the abdominal aorta was not enlarged and no bruit was heard [Breast Appearance] : normal in appearance [Breast Palpation Mass] : no palpable masses [Bowel Sounds] : normal bowel sounds [Abdomen Soft] : soft [Abdomen Tenderness] : non-tender [Abdomen Mass (___ Cm)] : no abdominal mass palpated [Cervical Lymph Nodes Enlarged Posterior Bilaterally] : posterior cervical [Cervical Lymph Nodes Enlarged Anterior Bilaterally] : anterior cervical [Supraclavicular Lymph Nodes Enlarged Bilaterally] : supraclavicular [No CVA Tenderness] : no ~M costovertebral angle tenderness [No Spinal Tenderness] : no spinal tenderness [Abnormal Walk] : normal gait [Nail Clubbing] : no clubbing  or cyanosis of the fingernails [Musculoskeletal - Swelling] : no joint swelling seen [Motor Tone] : muscle strength and tone were normal [Skin Color & Pigmentation] : normal skin color and pigmentation [Skin Turgor] : normal skin turgor [] : no rash [Deep Tendon Reflexes (DTR)] : deep tendon reflexes were 2+ and symmetric [Sensation] : the sensory exam was normal to light touch and pinprick [No Focal Deficits] : no focal deficits [Oriented To Time, Place, And Person] : oriented to person, place, and time [Impaired Insight] : insight and judgment were intact [Affect] : the affect was normal [Right Carotid Bruit] : no bruit heard over the right carotid [Left Carotid Bruit] : no bruit heard over the left carotid [Right Femoral Bruit] : no bruit heard over the right femoral artery [Left Femoral Bruit] : no bruit heard over the left femoral artery [FreeTextEntry1] : Deferred

## 2021-09-21 NOTE — ASSESSMENT
[FreeTextEntry1] : Ms. VINAYAK RUTH, 71 year old female, never smoker, w/ hx of papillary thyroid ca, s/p total thyroidectomy [id number] and LND in 2009 (s/p JOLLY - low dose per patient). In 06/22/2018 found to have recurrent papillary thyroid carcinoma in left paratracheal LN. She was referred to nuclear med but was not recommended since there was no iodine uptake even at 48 hours on repeat I-131 scan.\par \par Referred to office by Dr. Scott for further evaluation and treatment. \par \par CT chest on 8/10/2021: \par - 1.2 cm right upper lobe groundglass nodule (Series 3, Image 44) minimally increased in size since July 31, 2020 when it measured about 1 cm, measuring about 8 mm on August 10, 2018. No internal solid component.\par - Multiple bilateral solid-appearing discrete pulmonary nodules, some of which demonstrate minimal to mild interval increase in size since July 31, 2020. For reference a right lower lobe centrally located pulmonary nodule measures about 1.2 cm on image 91 of series 3 when it measured about 1 cm on July 31, 2020. A left lower lobe reference 9 mm pulmonary nodule previously measured about 6 mm on July 31, 2020.\par - Unchanged Hemangioma within the T7 vertebral body \par \par I have reviewed the patient's medical records and diagnostic images at time of this office consultation and have made the following recommendation:\par 1. CT chest reviewed and explained to patient, 1.2 cm right upper lobe nodule is pure groundglass nodule with no internal solid component, I recommended patient to RTC in 3 months with CT chest w/o contrast (ordered by Dr. Scott). \par \par I personally performed the services described in the documentation, reviewed the documentation recorded by the scribe in my presence and it accurately and completely records my words and actions.\par \par I, Melany Muñiz NP, am scribing for and the presence of ANNALISE Matthews, the following sections HISTORY OF PRESENT ILLNESS, PAST MEDICAL/FAMILY/SOCIAL HISTORY; REVIEW OF SYSTEMS; VITAL SIGNS; PHYSICAL EXAM; DISPOSITION.

## 2021-09-21 NOTE — HISTORY OF PRESENT ILLNESS
[FreeTextEntry1] : Ms. VINAYAK RUTH, 71 year old female, never smoker, w/ hx of papillary thyroid ca, s/p total thyroidectomy [id number] and LND in 2009 (s/p JOLLY - low dose per patient). In 06/22/2018 found to have recurrent papillary thyroid carcinoma in left paratracheal LN. She was referred to nuclear med but was not recommended since there was no iodine uptake even at 48 hours on repeat I-131 scan.\par \par Referred to office by Dr. Scott for further evaluation and treatment. \par \par CT chest on 8/10/2021: \par - 1.2 cm right upper lobe groundglass nodule (Series 3, Image 44) minimally increased in size since July 31, 2020 when it measured about 1 cm, measuring about 8 mm on August 10, 2018. No internal solid component.\par - Multiple bilateral solid-appearing discrete pulmonary nodules, some of which demonstrate minimal to mild interval increase in size since July 31, 2020. For reference a right lower lobe centrally located pulmonary nodule measures about 1.2 cm on image 91 of series 3 when it measured about 1 cm on July 31, 2020. A left lower lobe reference 9 mm pulmonary nodule previously measured about 6 mm on July 31, 2020.\par - Unchanged Hemangioma within the T7 vertebral body \par \par Patient presents to office for evaluation. Patient denies shortness of breath, cough, chest pain, fever, chills, loss of appetite, weight loss, or hemoptysis.

## 2021-12-03 ENCOUNTER — OUTPATIENT (OUTPATIENT)
Dept: OUTPATIENT SERVICES | Facility: HOSPITAL | Age: 71
LOS: 1 days | End: 2021-12-03
Payer: MEDICARE

## 2021-12-03 ENCOUNTER — APPOINTMENT (OUTPATIENT)
Dept: CT IMAGING | Facility: CLINIC | Age: 71
End: 2021-12-03
Payer: MEDICARE

## 2021-12-03 DIAGNOSIS — E89.0 POSTPROCEDURAL HYPOTHYROIDISM: Chronic | ICD-10-CM

## 2021-12-03 DIAGNOSIS — R91.8 OTHER NONSPECIFIC ABNORMAL FINDING OF LUNG FIELD: ICD-10-CM

## 2021-12-03 PROCEDURE — 71260 CT THORAX DX C+: CPT

## 2021-12-03 PROCEDURE — 70491 CT SOFT TISSUE NECK W/DYE: CPT | Mod: 26,MH

## 2021-12-03 PROCEDURE — 71260 CT THORAX DX C+: CPT | Mod: 26,MH

## 2021-12-03 PROCEDURE — 70491 CT SOFT TISSUE NECK W/DYE: CPT

## 2021-12-20 ENCOUNTER — OUTPATIENT (OUTPATIENT)
Dept: OUTPATIENT SERVICES | Facility: HOSPITAL | Age: 71
LOS: 1 days | Discharge: HOME | End: 2021-12-20

## 2021-12-20 DIAGNOSIS — E89.0 POSTPROCEDURAL HYPOTHYROIDISM: Chronic | ICD-10-CM

## 2021-12-20 DIAGNOSIS — C73 MALIGNANT NEOPLASM OF THYROID GLAND: ICD-10-CM

## 2021-12-21 ENCOUNTER — APPOINTMENT (OUTPATIENT)
Dept: THORACIC SURGERY | Facility: CLINIC | Age: 71
End: 2021-12-21
Payer: MEDICARE

## 2021-12-21 ENCOUNTER — APPOINTMENT (OUTPATIENT)
Dept: HEMATOLOGY ONCOLOGY | Facility: CLINIC | Age: 71
End: 2021-12-21
Payer: MEDICARE

## 2021-12-21 ENCOUNTER — RESULT REVIEW (OUTPATIENT)
Age: 71
End: 2021-12-21

## 2021-12-21 ENCOUNTER — LABORATORY RESULT (OUTPATIENT)
Age: 71
End: 2021-12-21

## 2021-12-21 VITALS
DIASTOLIC BLOOD PRESSURE: 83 MMHG | BODY MASS INDEX: 26.16 KG/M2 | HEIGHT: 65 IN | RESPIRATION RATE: 18 BRPM | OXYGEN SATURATION: 98 % | HEART RATE: 90 BPM | SYSTOLIC BLOOD PRESSURE: 119 MMHG | WEIGHT: 157 LBS

## 2021-12-21 VITALS
DIASTOLIC BLOOD PRESSURE: 68 MMHG | WEIGHT: 156 LBS | TEMPERATURE: 96.9 F | BODY MASS INDEX: 25.96 KG/M2 | RESPIRATION RATE: 16 BRPM | OXYGEN SATURATION: 98 % | SYSTOLIC BLOOD PRESSURE: 108 MMHG | HEART RATE: 73 BPM

## 2021-12-21 DIAGNOSIS — D49.7 NEOPLASM OF UNSPECIFIED BEHAVIOR OF ENDOCRINE GLANDS AND OTHER PARTS OF NERVOUS SYSTEM: ICD-10-CM

## 2021-12-21 DIAGNOSIS — R91.8 OTHER NONSPECIFIC ABNORMAL FINDING OF LUNG FIELD: ICD-10-CM

## 2021-12-21 LAB
BASOPHILS # BLD AUTO: 0.08 K/UL — SIGNIFICANT CHANGE UP (ref 0–0.2)
BASOPHILS NFR BLD AUTO: 0.9 % — SIGNIFICANT CHANGE UP (ref 0–2)
EOSINOPHIL # BLD AUTO: 0.06 K/UL — SIGNIFICANT CHANGE UP (ref 0–0.5)
EOSINOPHIL NFR BLD AUTO: 0.7 % — SIGNIFICANT CHANGE UP (ref 0–6)
HCT VFR BLD CALC: 37.4 % — SIGNIFICANT CHANGE UP (ref 34.5–45)
HGB BLD-MCNC: 12.9 G/DL — SIGNIFICANT CHANGE UP (ref 11.5–15.5)
IMM GRANULOCYTES NFR BLD AUTO: 0.3 % — SIGNIFICANT CHANGE UP (ref 0–1.5)
LYMPHOCYTES # BLD AUTO: 2.49 K/UL — SIGNIFICANT CHANGE UP (ref 1–3.3)
LYMPHOCYTES # BLD AUTO: 27.5 % — SIGNIFICANT CHANGE UP (ref 13–44)
MCHC RBC-ENTMCNC: 34.5 G/DL — SIGNIFICANT CHANGE UP (ref 32–36)
MCHC RBC-ENTMCNC: 35.7 PG — HIGH (ref 27–34)
MCV RBC AUTO: 103.6 FL — HIGH (ref 80–100)
MONOCYTES # BLD AUTO: 0.85 K/UL — SIGNIFICANT CHANGE UP (ref 0–0.9)
MONOCYTES NFR BLD AUTO: 9.4 % — SIGNIFICANT CHANGE UP (ref 2–14)
NEUTROPHILS # BLD AUTO: 5.56 K/UL — SIGNIFICANT CHANGE UP (ref 1.8–7.4)
NEUTROPHILS NFR BLD AUTO: 61.2 % — SIGNIFICANT CHANGE UP (ref 43–77)
NRBC # BLD: 0 /100 WBCS — SIGNIFICANT CHANGE UP (ref 0–0)
PLATELET # BLD AUTO: 267 K/UL — SIGNIFICANT CHANGE UP (ref 150–400)
RBC # BLD: 3.61 M/UL — LOW (ref 3.8–5.2)
RBC # FLD: 11.5 % — SIGNIFICANT CHANGE UP (ref 10.3–14.5)
WBC # BLD: 9.07 K/UL — SIGNIFICANT CHANGE UP (ref 3.8–10.5)
WBC # FLD AUTO: 9.07 K/UL — SIGNIFICANT CHANGE UP (ref 3.8–10.5)

## 2021-12-21 PROCEDURE — 99214 OFFICE O/P EST MOD 30 MIN: CPT

## 2021-12-21 NOTE — HISTORY OF PRESENT ILLNESS
[FreeTextEntry1] : Ms. VINAYAK RUTH, 71 year old female, never smoker, w/ hx of papillary thyroid ca, s/p total thyroidectomy [id number] and LND in 2009 (s/p JOLLY - low dose per patient). In 06/22/2018 found to have recurrent papillary thyroid carcinoma in left paratracheal LN. She was referred to nuclear med but was not recommended since there was no iodine uptake even at 48 hours on repeat I-131 scan.\par \par Referred to office by Dr. Scott for further evaluation and treatment. \par \par CT chest on 8/10/2021: \par - 1.2 cm right upper lobe groundglass nodule (Series 3, Image 44) minimally increased in size since July 31, 2020 when it measured about 1 cm, measuring about 8 mm on August 10, 2018. No internal solid component.\par - Multiple bilateral solid-appearing discrete pulmonary nodules, some of which demonstrate minimal to mild interval increase in size since July 31, 2020. For reference a right lower lobe centrally located pulmonary nodule measures about 1.2 cm on image 91 of series 3 when it measured about 1 cm on July 31, 2020. A left lower lobe reference 9 mm pulmonary nodule previously measured about 6 mm on July 31, 2020.\par - Unchanged Hemangioma within the T7 vertebral body \par \par CT chest on 12/03/2021:\par - Multiple bilateral solid appearing pulmonary nodules with the largest in the left lower lobe measuring about 1.2 cm without significant interval change since August 10, 2021\par - The previously described right upper lobe 1.2 cm groundglass nodule on image 38 of series 3 is unchanged since August 10, 2021, mildly increased in size since August 10, 2018.\par - Hemangioma within the T7 vertebral body is unchanged.\par \par Patient is here today for a follow up. Patient denies shortness of breath, cough, chest pain, fever, chills.

## 2021-12-21 NOTE — ASSESSMENT
[FreeTextEntry1] : 72 yo w with recurrent differentiated thyroid cancer, s/p resection of recurrent LN in neck\par Last 1-131 scan negative and hence, no recent JOLLY tx. 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 \par Follow up with endocrine, Dr. Pa. Had recent blood work \par Follow up with Dr. Lino\par OV in 6 months\par

## 2021-12-21 NOTE — ASSESSMENT
[FreeTextEntry1] : Ms. VINAYAK RUTH, 71 year old female, never smoker, w/ hx of papillary thyroid ca, s/p total thyroidectomy [id number] and LND in 2009 (s/p JOLLY - low dose per patient). In 06/22/2018 found to have recurrent papillary thyroid carcinoma in left paratracheal LN. She was referred to nuclear med but was not recommended since there was no iodine uptake even at 48 hours on repeat I-131 scan.\par \par Referred to office by Dr. Scott for further evaluation and treatment. \par \par CT chest on 8/10/2021: \par - 1.2 cm right upper lobe groundglass nodule (Series 3, Image 44) minimally increased in size since July 31, 2020 when it measured about 1 cm, measuring about 8 mm on August 10, 2018. No internal solid component.\par - Multiple bilateral solid-appearing discrete pulmonary nodules, some of which demonstrate minimal to mild interval increase in size since July 31, 2020. For reference a right lower lobe centrally located pulmonary nodule measures about 1.2 cm on image 91 of series 3 when it measured about 1 cm on July 31, 2020. A left lower lobe reference 9 mm pulmonary nodule previously measured about 6 mm on July 31, 2020.\par - Unchanged Hemangioma within the T7 vertebral body \par \par CT chest on 12/03/2021:\par - Multiple bilateral solid appearing pulmonary nodules with the largest in the left lower lobe measuring about 1.2 cm without significant interval change since August 10, 2021\par - The previously described right upper lobe 1.2 cm groundglass nodule on image 38 of series 3 is unchanged since August 10, 2021, mildly increased in size since August 10, 2018.\par - Hemangioma within the T7 vertebral body is unchanged.\par \par I have reviewed the patient's medical records and diagnostic images at time of this office consultation and have made the following recommendation:\par 1. CT chest reviewed and explained to patient, I recommended patient to return to office in 6 months with CT Chest without contrast. \par \par I personally performed the services described in the documentation, reviewed the documentation recorded by the scribe in my presence and it accurately and completely records my words and actions.\par \par I, Melany Muñiz NP, am scribing for and the presence of ANNALISE Matthews, the following sections HISTORY OF PRESENT ILLNESS, PAST MEDICAL/FAMILY/SOCIAL HISTORY; REVIEW OF SYSTEMS; VITAL SIGNS; PHYSICAL EXAM; DISPOSITION.

## 2021-12-21 NOTE — HISTORY OF PRESENT ILLNESS
[Disease: _____________________] : Disease: [unfilled] [Date: ____________] : Patient's last distress assessment performed on [unfilled]. [0 - No Distress] : Distress Level: 0 [de-identified] : Ms. Nava is a pleasant 69 yo w with hx of papillary thyroid ca, s/p total thyroidectomy [id number] and LND in 2009 (multifocal, largest 2.5 cm with extra-thyroidal extension, 1/6 LN pos). This was followed by JOLLY (low dose per patient). She was being followed sporadically with CT scans till an US in early 2018 which showed parathyroidal LAD. Iodine scan showed no uptake in thyroid bed or anywhere else. PET/CT showed small cervical LN and sub-cm lung nodules. Pt underwent LN excsion on 6/22/18- this showed recurrent papillary thyroid cancer in left paratracheal LN. She was referred to nuclear med but was not recommended since there was no iodine uptake even at 48 hours on repeat I-131 scan.Thyrogen stimulated thyroglobulin was however elevated, last checked in October was 144. Subsequent unstimulated thyroglobulin levels are normal. CT chest in December showed stable sub-cm LN and lung nodules. \par Pt is asymptomatic. She is taking 100 mcg of Synthroid. She does not have an endocrinologist. \par \par 7/26/219: Pt continues on 100 mcg of Synthroid. She had CT scans done on 7/9 which show 2 small sub-cm nodes in the supraclav area and completely stable lung nodules. She remains asymptomatic\par \par 1/28/20: Gaining weight. No other complaints. \par \par 8/6/20: No complaints. Feeling well. Had CT scans and is anxious for results. \par \par 2/11/21: NO complaints. Doing well. \par \par 12/21/2021\par She is doing well. She has an upcoming appointment with Dr. Lino later today. \par She is taking levothyroxine. \par Thyroglobulin went down from 134 to 97. \par  [de-identified] : papillary thyroid ca

## 2021-12-21 NOTE — PHYSICAL EXAM
[Fully active, able to carry on all pre-disease performance without restriction] : Status 0 - Fully active, able to carry on all pre-disease performance without restriction [Normal] : affect appropriate [de-identified] : breathing comfortably

## 2022-01-05 NOTE — ASU PATIENT PROFILE, ADULT - SURGICAL SITE INCISION
rash around the mouth.   Neurological:      Mental Status: He is alert.         ASSESSMENT/PLAN     Viral URI  Bilateral otitis media  Perioral dermatitis      COVID/flu/RSV swab obtained and pending  Quarantine per CDC guidelines was discussed.  Supportive measures for congestion and rhinorrhea were advised.  Recommended continued use of a cool mist vaporizer in the bedroom during naps and bedtime.  Use of a steamed up bathroom allowing him to play in the bath tub  prior to bedtime to help clear the nose of nasal secretions was advised.  Did address use of saline drops and bulb suctioning of the nose.  If he is resisted this may cause more harm than good since he may quiet increased nasal secretions.  Continue to give Tylenol alternating with Motrin for fever or discomfort.  Dosing by weight was reviewed.  To start Omnicef 3.6 mm mL p.o. daily times 10 days  Reviewed signs of increased work of breathing if they occur, decreased p.o. intake or worsening symptoms he should follow up.      20 minutes of provider's total time spent this visit in the care of the child  including pre-service record review, face-to-face visit, development of a treatment plan, documentation of services, post-visit care coordination and counseling and educating patient/family.      no

## 2022-01-06 LAB
ALBUMIN SERPL ELPH-MCNC: 4.6 G/DL
ALP BLD-CCNC: 85 U/L
ALT SERPL-CCNC: 49 U/L
ANION GAP SERPL CALC-SCNC: 13 MMOL/L
AST SERPL-CCNC: 75 U/L
BILIRUB SERPL-MCNC: 1.2 MG/DL
BUN SERPL-MCNC: 8 MG/DL
CALCIUM SERPL-MCNC: 9.4 MG/DL
CHLORIDE SERPL-SCNC: 96 MMOL/L
CO2 SERPL-SCNC: 25 MMOL/L
CREAT SERPL-MCNC: 0.71 MG/DL
GLUCOSE SERPL-MCNC: 112 MG/DL
MAGNESIUM SERPL-MCNC: 1.9 MG/DL
POTASSIUM SERPL-SCNC: 3.7 MMOL/L
PROT SERPL-MCNC: 7.4 G/DL
SODIUM SERPL-SCNC: 134 MMOL/L
THYROGLOB AB SERPL-ACNC: <20 IU/ML
THYROGLOB SERPL-MCNC: 85.7 NG/ML
TSH SERPL-ACNC: 0.63 UIU/ML

## 2022-08-09 ENCOUNTER — RESULT REVIEW (OUTPATIENT)
Age: 72
End: 2022-08-09

## 2022-09-29 ENCOUNTER — OUTPATIENT (OUTPATIENT)
Dept: OUTPATIENT SERVICES | Facility: HOSPITAL | Age: 72
LOS: 1 days | Discharge: HOME | End: 2022-09-29

## 2022-09-29 DIAGNOSIS — C73 MALIGNANT NEOPLASM OF THYROID GLAND: ICD-10-CM

## 2022-09-29 DIAGNOSIS — E89.0 POSTPROCEDURAL HYPOTHYROIDISM: Chronic | ICD-10-CM

## 2022-10-06 ENCOUNTER — APPOINTMENT (OUTPATIENT)
Dept: HEMATOLOGY ONCOLOGY | Facility: CLINIC | Age: 72
End: 2022-10-06

## 2022-10-17 ENCOUNTER — OUTPATIENT (OUTPATIENT)
Dept: OUTPATIENT SERVICES | Facility: HOSPITAL | Age: 72
LOS: 1 days | End: 2022-10-17
Payer: MEDICARE

## 2022-10-17 ENCOUNTER — APPOINTMENT (OUTPATIENT)
Dept: CT IMAGING | Facility: CLINIC | Age: 72
End: 2022-10-17

## 2022-10-17 DIAGNOSIS — C73 MALIGNANT NEOPLASM OF THYROID GLAND: ICD-10-CM

## 2022-10-17 DIAGNOSIS — E89.0 POSTPROCEDURAL HYPOTHYROIDISM: Chronic | ICD-10-CM

## 2022-10-17 PROCEDURE — 70491 CT SOFT TISSUE NECK W/DYE: CPT

## 2022-10-17 PROCEDURE — 70491 CT SOFT TISSUE NECK W/DYE: CPT | Mod: 26,MH

## 2022-10-17 PROCEDURE — 71260 CT THORAX DX C+: CPT | Mod: 26,MH

## 2022-10-17 PROCEDURE — 71260 CT THORAX DX C+: CPT

## 2022-10-18 ENCOUNTER — APPOINTMENT (OUTPATIENT)
Dept: THORACIC SURGERY | Facility: CLINIC | Age: 72
End: 2022-10-18

## 2022-11-15 ENCOUNTER — APPOINTMENT (OUTPATIENT)
Dept: HEMATOLOGY ONCOLOGY | Facility: CLINIC | Age: 72
End: 2022-11-15

## 2022-11-15 ENCOUNTER — RESULT REVIEW (OUTPATIENT)
Age: 72
End: 2022-11-15

## 2022-11-15 ENCOUNTER — LABORATORY RESULT (OUTPATIENT)
Age: 72
End: 2022-11-15

## 2022-11-15 VITALS
DIASTOLIC BLOOD PRESSURE: 77 MMHG | HEART RATE: 70 BPM | SYSTOLIC BLOOD PRESSURE: 119 MMHG | WEIGHT: 147.71 LBS | OXYGEN SATURATION: 100 % | RESPIRATION RATE: 16 BRPM | BODY MASS INDEX: 24.61 KG/M2 | HEIGHT: 64.96 IN | TEMPERATURE: 98.1 F

## 2022-11-15 DIAGNOSIS — C73 MALIGNANT NEOPLASM OF THYROID GLAND: ICD-10-CM

## 2022-11-15 LAB
BASOPHILS # BLD AUTO: 0.06 K/UL — SIGNIFICANT CHANGE UP (ref 0–0.2)
BASOPHILS NFR BLD AUTO: 0.7 % — SIGNIFICANT CHANGE UP (ref 0–2)
EOSINOPHIL # BLD AUTO: 0.2 K/UL — SIGNIFICANT CHANGE UP (ref 0–0.5)
EOSINOPHIL NFR BLD AUTO: 2.4 % — SIGNIFICANT CHANGE UP (ref 0–6)
HCT VFR BLD CALC: 36.9 % — SIGNIFICANT CHANGE UP (ref 34.5–45)
HGB BLD-MCNC: 12.6 G/DL — SIGNIFICANT CHANGE UP (ref 11.5–15.5)
IMM GRANULOCYTES NFR BLD AUTO: 0.1 % — SIGNIFICANT CHANGE UP (ref 0–0.9)
LYMPHOCYTES # BLD AUTO: 3.31 K/UL — HIGH (ref 1–3.3)
LYMPHOCYTES # BLD AUTO: 39.7 % — SIGNIFICANT CHANGE UP (ref 13–44)
MCHC RBC-ENTMCNC: 33.2 PG — SIGNIFICANT CHANGE UP (ref 27–34)
MCHC RBC-ENTMCNC: 34.1 G/DL — SIGNIFICANT CHANGE UP (ref 32–36)
MCV RBC AUTO: 97.1 FL — SIGNIFICANT CHANGE UP (ref 80–100)
MONOCYTES # BLD AUTO: 0.67 K/UL — SIGNIFICANT CHANGE UP (ref 0–0.9)
MONOCYTES NFR BLD AUTO: 8 % — SIGNIFICANT CHANGE UP (ref 2–14)
NEUTROPHILS # BLD AUTO: 4.08 K/UL — SIGNIFICANT CHANGE UP (ref 1.8–7.4)
NEUTROPHILS NFR BLD AUTO: 49.1 % — SIGNIFICANT CHANGE UP (ref 43–77)
NRBC # BLD: 0 /100 WBCS — SIGNIFICANT CHANGE UP (ref 0–0)
PLATELET # BLD AUTO: 257 K/UL — SIGNIFICANT CHANGE UP (ref 150–400)
RBC # BLD: 3.8 M/UL — SIGNIFICANT CHANGE UP (ref 3.8–5.2)
RBC # FLD: 11.9 % — SIGNIFICANT CHANGE UP (ref 10.3–14.5)
WBC # BLD: 8.33 K/UL — SIGNIFICANT CHANGE UP (ref 3.8–10.5)
WBC # FLD AUTO: 8.33 K/UL — SIGNIFICANT CHANGE UP (ref 3.8–10.5)

## 2022-11-15 PROCEDURE — 99214 OFFICE O/P EST MOD 30 MIN: CPT

## 2022-11-16 LAB
ALBUMIN SERPL ELPH-MCNC: 4.5 G/DL
ALP BLD-CCNC: 69 U/L
ALT SERPL-CCNC: 16 U/L
ANION GAP SERPL CALC-SCNC: 10 MMOL/L
AST SERPL-CCNC: 26 U/L
BILIRUB SERPL-MCNC: 1 MG/DL
BUN SERPL-MCNC: 9 MG/DL
CALCIUM SERPL-MCNC: 10.1 MG/DL
CEA SERPL-MCNC: 1.8 NG/ML
CHLORIDE SERPL-SCNC: 99 MMOL/L
CO2 SERPL-SCNC: 28 MMOL/L
CREAT SERPL-MCNC: 0.72 MG/DL
EGFR: 88 ML/MIN/1.73M2
GLUCOSE SERPL-MCNC: 100 MG/DL
MAGNESIUM SERPL-MCNC: 1.8 MG/DL
POTASSIUM SERPL-SCNC: 4.3 MMOL/L
PROT SERPL-MCNC: 7.5 G/DL
SODIUM SERPL-SCNC: 137 MMOL/L
TSH SERPL-ACNC: 0.03 UIU/ML

## 2022-11-18 LAB
THYROGLOB AB SERPL-ACNC: <20 IU/ML
THYROGLOB SERPL-MCNC: 97.8 NG/ML

## 2022-11-22 NOTE — HISTORY OF PRESENT ILLNESS
[Disease: _____________________] : Disease: [unfilled] [Date: ____________] : Patient's last distress assessment performed on [unfilled]. [0 - No Distress] : Distress Level: 0 [de-identified] : Ms. Nava is a pleasant 69 yo w with hx of papillary thyroid ca, s/p total thyroidectomy [id number] and LND in 2009 (multifocal, largest 2.5 cm with extra-thyroidal extension, 1/6 LN pos). This was followed by [facility] (low dose per patient). She was being followed sporadically with CT scans till an US in early 2018 which showed parathyroidal LAD. Iodine scan showed no uptake in thyroid bed or anywhere else. PET/CT showed small cervical LN and sub-cm lung nodules. Pt underwent LN excsion on 6/22/18- this showed recurrent papillary thyroid cancer in left paratracheal LN. She was referred to nuclear med but was not recommended since there was no iodine uptake even at 48 hours on repeat I-131 scan.Thyrogen stimulated thyroglobulin was however elevated, last checked in October was 144. Subsequent unstimulated thyroglobulin levels are normal. CT chest in December showed stable sub-cm LN and lung nodules. \par \par Pt is asymptomatic. She is taking 100 mcg of Synthroid. She does not have an endocrinologist. \par \par 7/26/219: Pt continues on 100 mcg of Synthroid. She had CT scans done on 7/9 which show 2 small sub-cm nodes in the supraclav area and completely stable lung nodules. She remains asymptomatic\par \par 1/28/20: Gaining weight. No other complaints. \par \par 8/6/20: No complaints. Feeling well. Had CT scans and is anxious for results. \par \par 2/11/21: No complaints. Doing well. \par \par 12/21/2021: She is doing well. She has an upcoming appointment with Dr. Lino later today. She is taking levothyroxine. Thyroglobulin went down from 134 to 97. \par \par 11/19/2022: Doing well. No fever, chills, SOB, chest pain, nausea, vomiting, abdominal pain. \par  [de-identified] : papillary thyroid ca

## 2022-11-22 NOTE — PHYSICAL EXAM
[Fully active, able to carry on all pre-disease performance without restriction] : Status 0 - Fully active, able to carry on all pre-disease performance without restriction [Normal] : affect appropriate [de-identified] : breathing comfortably

## 2022-11-22 NOTE — PHYSICAL EXAM
[Fully active, able to carry on all pre-disease performance without restriction] : Status 0 - Fully active, able to carry on all pre-disease performance without restriction [Normal] : affect appropriate [de-identified] : breathing comfortably

## 2022-11-22 NOTE — ASSESSMENT
[FreeTextEntry1] : 72 yo w with recurrent differentiated thyroid cancer, s/p resection of recurrent LN in neck\par Last 1-131 scan negative and hence, no recent JOLLY tx. 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 \par \par CT neck soft tissue w/ IV contrast 10/2022 reviewed; No new nodular enhancement to suggest recurrent tumor. \par CT chest IV contrast 10/2022 reviewed: Since 12/2021, slightly larger LLL nodule, otherwise multiple bilateral solid lung nodules likely metastases. Stable 1.5cm RUL nonsolid nodule, slowly growing, likely primary lung. \par \par - Follow up with endocrine, Dr. Pa. TSH 0.03 on labs today. May need to go up to 125mcg levothyroxine (currently patient takes 100mcg)\par - Follow up with Dr. Lino given above findings\par - OV in 6 months\par \par \par I was with the hematology/ oncology resident, Dr. Valera for the entire visit and agree with above documentation\par \par

## 2022-11-22 NOTE — HISTORY OF PRESENT ILLNESS
[Disease: _____________________] : Disease: [unfilled] [Date: ____________] : Patient's last distress assessment performed on [unfilled]. [0 - No Distress] : Distress Level: 0 [de-identified] : Ms. Nava is a pleasant 71 yo w with hx of papillary thyroid ca, s/p total thyroidectomy [id number] and LND in 2009 (multifocal, largest 2.5 cm with extra-thyroidal extension, 1/6 LN pos). This was followed by [facility] (low dose per patient). She was being followed sporadically with CT scans till an US in early 2018 which showed parathyroidal LAD. Iodine scan showed no uptake in thyroid bed or anywhere else. PET/CT showed small cervical LN and sub-cm lung nodules. Pt underwent LN excsion on 6/22/18- this showed recurrent papillary thyroid cancer in left paratracheal LN. She was referred to nuclear med but was not recommended since there was no iodine uptake even at 48 hours on repeat I-131 scan.Thyrogen stimulated thyroglobulin was however elevated, last checked in October was 144. Subsequent unstimulated thyroglobulin levels are normal. CT chest in December showed stable sub-cm LN and lung nodules. \par \par Pt is asymptomatic. She is taking 100 mcg of Synthroid. She does not have an endocrinologist. \par \par 7/26/219: Pt continues on 100 mcg of Synthroid. She had CT scans done on 7/9 which show 2 small sub-cm nodes in the supraclav area and completely stable lung nodules. She remains asymptomatic\par \par 1/28/20: Gaining weight. No other complaints. \par \par 8/6/20: No complaints. Feeling well. Had CT scans and is anxious for results. \par \par 2/11/21: No complaints. Doing well. \par \par 12/21/2021: She is doing well. She has an upcoming appointment with Dr. Lino later today. She is taking levothyroxine. Thyroglobulin went down from 134 to 97. \par \par 11/19/2022: Doing well. No fever, chills, SOB, chest pain, nausea, vomiting, abdominal pain. \par  [de-identified] : papillary thyroid ca

## 2022-12-07 NOTE — DISCHARGE NOTE ADULT - NSTOBACCONEVERSMOKERY/N_GEN_A
No
Risks, benefits and alternatives including but not limited to nausea, bleeding, and local trauma/positioning related injury discussed. All questions answered. The patient agrees to proceed.

## 2023-10-05 NOTE — H&P PST ADULT - NEGATIVE RESPIRATORY AND THORAX SYMPTOMS
no hemoptysis/no pleuritic chest pain/no wheezing/no dyspnea/no cough Burow's Graft Text: The defect edges were debeveled with a #15 scalpel blade.  Given the location of the defect, shape of the defect, the proximity to free margins and the presence of a standing cone deformity a Burow's skin graft was deemed most appropriate. The standing cone was removed and this tissue was then trimmed to the shape of the primary defect. The adipose tissue was also removed until only dermis and epidermis were left.  The skin margins of the secondary defect were undermined to an appropriate distance in all directions utilizing iris scissors.  The secondary defect was closed with interrupted buried subcutaneous sutures.  The skin edges were then re-apposed with running  sutures.  The skin graft was then placed in the primary defect and oriented appropriately.

## 2023-11-27 ENCOUNTER — APPOINTMENT (OUTPATIENT)
Dept: ORTHOPEDIC SURGERY | Facility: CLINIC | Age: 73
End: 2023-11-27
Payer: MEDICARE

## 2023-11-27 VITALS
DIASTOLIC BLOOD PRESSURE: 84 MMHG | BODY MASS INDEX: 24.99 KG/M2 | HEART RATE: 90 BPM | WEIGHT: 150 LBS | HEIGHT: 65 IN | SYSTOLIC BLOOD PRESSURE: 120 MMHG

## 2023-11-27 DIAGNOSIS — M25.511 PAIN IN RIGHT SHOULDER: ICD-10-CM

## 2023-11-27 PROCEDURE — 99203 OFFICE O/P NEW LOW 30 MIN: CPT

## 2023-11-27 PROCEDURE — 73030 X-RAY EXAM OF SHOULDER: CPT | Mod: RT

## 2023-11-27 RX ORDER — MELOXICAM 15 MG/1
15 TABLET ORAL
Qty: 21 | Refills: 0 | Status: ACTIVE | COMMUNITY
Start: 2023-11-27 | End: 1900-01-01

## 2023-11-29 NOTE — PROGRESS NOTE ADULT - PROVIDER SPECIALTY LIST ADULT
Hospitalist Rx Refill Note  Requested Prescriptions     Pending Prescriptions Disp Refills    amphetamine-dextroamphetamine (Adderall) 10 MG tablet 60 tablet 0     Sig: Take 1 tablet by mouth 2 (Two) Times a Day.      Last office visit with prescribing clinician: Visit date not found   Last telemedicine visit with prescribing clinician: Visit date not found   Next office visit with prescribing clinician: Visit date not found                         Would you like a call back once the refill request has been completed: [] Yes [] No    If the office needs to give you a call back, can they leave a voicemail: [] Yes [] No    Marilou Villavicencio MA  11/29/23, 10:01 EST

## 2024-02-16 NOTE — ASU PREOP CHECKLIST - RESPIRATORY RATE (BREATHS/MIN)
The first balloon was inserted into the right coronary artery and middle right coronary artery.Max pressure = 12 jen. Total duration = 10 seconds. 16

## 2025-07-18 NOTE — H&P PST ADULT - NSANTHTOTALSCORECAL_ENT_A_CORE
[facility] Internal Medicine   Progress Note              Subjective:  Patient seen and examined by me.  Seen sitting up in bed.  No new complaints. No SOB, CP.  Video capsule neg. Restart Eliquis.             14 point Review of systems is negative except for as noted above.     I/O's  No intake or output data in the 24 hours ending 2524      ALLERGIES:  Patient has no known allergies.     No data recorded  MAP (mmHg)  Av.7  Min: 75  Max: 82          HOSPITAL MEDS  Current Facility-Administered Medications   Medication Dose Route Frequency Provider Last Rate Last Admin    ferrous sulfate (65 mg Fe per 325 mg) tablet 325 mg  325 mg Oral Daily with breakfast Majid, Hadeel A, APNP   325 mg at 25 0821    Potassium Standard Replacement Protocol (Levels 3.5 and lower)   Does not apply See Admin Instructions Ali, Enriqueta, DO        Magnesium Standard Replacement Protocol   Does not apply See Admin Instructions Ali, Enriqueta, DO        Phosphorus Standard Replacement Protocol   Does not apply See Admin Instructions Ali, Enriqueta, DO        sodium chloride 0.9 % injection 2 mL  2 mL Intracatheter 2 times per day Ali, Enriqueta, DO   2 mL at 25 0822    allopurinol (ZYLOPRIM) tablet 300 mg  300 mg Oral Daily Majid, Hadeel A, APNP   300 mg at 25 0821    [Held by provider] losartan (COZAAR) tablet 100 mg  100 mg Oral Nightly Majid, Hadeel A, APNP        metoPROLOL succinate (TOPROL-XL) ER tablet 25 mg  25 mg Oral Daily Majid, Hadeel A, APNP   25 mg at 25 0821    atorvastatin (LIPITOR) tablet 10 mg  10 mg Oral Nightly Majid, Hadeel A, APNP   10 mg at 25 2158    torsemide (DEMADEX) tablet 20 mg  20 mg Oral Daily Vanita Cary MD   20 mg at 25 0821    [Held by provider] spironolactone (ALDACTONE) tablet 25 mg  25 mg Oral Daily Majid, Hadeel A, APNP        insulin lispro (ADMELOG,HumaLOG) - Correction Dose   Subcutaneous TID WC Majid, Hadeel A, APNP   1 Units at 25 1250    insulin  lispro (ADMELOG,HumaLOG) - Correction Dose   Subcutaneous Nightly Majid, Hadeel A, APNP        sodium chloride 0.9 % injection 2 mL  2 mL Intracatheter 2 times per day Frankie Sandoval MD   2 mL at 07/17/25 8643       Current Facility-Administered Medications   Medication Dose Route Frequency Provider Last Rate Last Admin       Current Facility-Administered Medications   Medication Dose Route Frequency Provider Last Rate Last Admin    acetaminophen (TYLENOL) tablet 650 mg  650 mg Oral Q4H PRN Ali, Enriqueta, DO        Or    acetaminophen (TYLENOL) suppository 650 mg  650 mg Rectal Q4H PRN Ali, Enriqueta, DO        ondansetron (ZOFRAN ODT) disintegrating tablet 4 mg  4 mg Oral Q6H PRN Ali, Enriqueta, DO        Or    ondansetron (ZOFRAN) injection 4 mg  4 mg Intravenous Q6H PRN Ali, Enriqueta, DO        polyethylene glycol (MIRALAX) packet 17 g  17 g Oral Daily PRN Ali, Enriqueta, DO        docusate sodium-sennosides (SENOKOT S) 50-8.6 MG 2 tablet  2 tablet Oral BID PRN Ali, Enriqueta, DO        bisacodyl (DULCOLAX) suppository 10 mg  10 mg Rectal Daily PRN Ali, Enriqueta, DO        melatonin tablet 6 mg  6 mg Oral Nightly PRN Ali, Enriqueta, DO        sodium chloride 0.9 % injection 10 mL  10 mL Intravenous PRN Ali, Enriqueta, DO        dextrose 50 % injection 25 g  25 g Intravenous PRN Majid, Hadeel A, APNP        dextrose 50 % injection 12.5 g  12.5 g Intravenous PRN Majid, Hadeel A, APNP        glucagon (GLUCAGEN) injection 1 mg  1 mg Intramuscular PRN Majid, Hadeel A, APNP        dextrose (GLUTOSE) 40 % gel 15 g  15 g Oral PRN Majid, Hadeel A, APNP        dextrose (GLUTOSE) 40 % gel 30 g  30 g Oral PRN Majid, Hadeel A, APNP        lidocaine HCl (PF) (XYLOCAINE) 1 % injection 5 mg  5 mg Subcutaneous PRN Frankie Sandoval MD        sodium chloride 0.9 % injection 10 mL  10 mL Intravenous PRN Frankie Sandoval MD        insulin lispro (ADMELOG, HumaLOG) sliding scale injection 0-12 Units  0-12 Units Subcutaneous Once PRN Frankie Sandoval MD               Last Recorded Vitals      SpO2 Readings from Last 3 Encounters:   07/18/25 97%   07/14/25 100%   07/07/25 100%        VITAL SIGNS:     Vital Last Value 24 Hour Range   Temperature 98.1 °F (36.7 °C) (07/18/25 0647) Temp  Min: 98.1 °F (36.7 °C)  Max: 98.4 °F (36.9 °C)   Pulse 88 (07/18/25 0819) Pulse  Min: 59  Max: 88   Respiratory 16 (07/16/25 0916) No data recorded   Non-Invasive  Blood Pressure 122/62 (07/18/25 0819) BP  Min: 94/57  Max: 122/62   Pulse Oximetry 97 % (07/18/25 0647) SpO2  Min: 97 %  Max: 100 %   Arterial   Blood Pressure   No data recorded      Vital Today Admitted   Weight 87.4 kg (192 lb 10.9 oz) (07/18/25 0541) Weight: 90.7 kg (199 lb 15.3 oz) (07/15/25 2200)   Height N/A Height: 6' 2\" (188 cm) (07/15/25 2200)   BMI N/A BMI (Calculated): 25.67 (07/15/25 2200)            Physical Exam:  Constitutional:       Appearance: Normal appearance.   HENT:      Head: Normocephalic and atraumatic.      Mouth/Throat:      Mouth: Mucous membranes are dry.   Eyes:      Extraocular Movements: Extraocular movements intact.      Conjunctiva/sclera: Conjunctivae normal.      Pupils: Pupils are equal, round, and reactive to light.   Cardiovascular:      Rate and Rhythm: Normal rate and regular rhythm.      Pulses: Normal pulses.      Heart sounds: Normal heart sounds.   Pulmonary:      Effort: Pulmonary effort is normal.      Breath sounds: Normal breath sounds.   Abdominal:      General: Abdomen is flat. Bowel sounds are normal.      Palpations: Abdomen is soft.   Musculoskeletal:         General: Normal range of motion.      Cervical back: Normal range of motion and neck supple.      Right lower leg: Edema present.      Left lower leg: Edema present.   Skin:     General: Skin is warm and dry.      Comments: LLE chronic wound above medial malleolus, no surrounding erythema, warmth, or drainage  DP pulses intact    Neurological:      General: No focal deficit present.      Mental Status: He is alert and  oriented to person, place, and time. Mental status is at baseline.      Comments: RUBÉN, does not have hearing aids   Psychiatric:         Mood and Affect: Mood normal.         Behavior: Behavior normal.    Labs   Recent Labs     07/16/25  0626 07/17/25  0608 07/18/25  0538   WBC 4.2 4.2  --    RBC 2.30* 2.37*  --    HGB 7.3* 7.5* 7.5*   HCT 23.2* 24.6*  --     147  --    .9* 103.8*  --    MCH 31.7 31.6  --    MCHC 31.5* 30.5*  --    NRBCRE 0 0  --          Recent Labs     07/15/25  1257 07/16/25  0626 07/17/25  0608 07/18/25  0538   SODIUM  --  140 140 137   POTASSIUM  --  5.0 4.5 4.5   CO2  --  26 25 24   ANIONGAP  --  9 9 10   GLUCOSE  --  142* 125* 132*   BUN  --  42* 35* 29*   CREATININE  --  2.14* 1.75* 1.78*   CALCIUM  --  9.4 9.5 9.1   BILIRUBIN  --  0.7 0.7  --    AST  --  20 12  --    GPT  --  16 14  --    ALKPT  --  177* 170*  --    GLOB  --  3.9 3.8  --    AGR  --  0.7* 0.7*  --    FERR 23*  --   --   --         Recent Labs   Lab 07/18/25  0538 07/17/25  0608 07/16/25  0626 07/15/25  0607   SODIUM 137 140 140 136   POTASSIUM 4.5 4.5 5.0 4.5   CHLORIDE 108 111* 110 103   CO2 24 25 26 25   BUN 29* 35* 42* 45*   CREATININE 1.78* 1.75* 2.14* 2.38*   GLUCOSE 132* 125* 142* 136*   ALBUMIN  --  2.8* 2.8* 3.0*   AST  --  12 20 16   BILIRUBIN  --  0.7 0.7 0.7       No results for input(s): \"PCT\" in the last 72 hours.     Recent Labs   Lab 07/14/25  2009   NTPROB 816*        No results for input(s): \"INR\", \"PT\", \"PTT\" in the last 72 hours.    Recent Labs   Lab 07/17/25  1150 07/17/25  1709 07/17/25 2013 07/18/25  0649   GLUCOSE BEDSIDE 173* 129* 194* 123*       Imaging    NM LUNG PERFUSION IMAGING   Final Result   Technically limited study (perfusion only).      No evidence of pulmonary embolism.      Radiation Dosimetry:   The radiopharmaceutical used for this exam delivers approximately 0.4   mSv/mCi (40 mRem/mCi)   Source:  RADIATION DOSE ESTIMATES TO ADULTS AND CHILDREN, [place];   Effective  dose RADAR               Electronically Signed by: FILEMON PAYTON MD    Signed on: 7/16/2025 4:00 PM    Workstation ID: [id number]      CT ABDOMEN PELVIS WO CONTRAST   Final Result   The heart is enlarged. Coronary artery calcifications.      IVC and hepatic veins are enlarged. This is indicative of elevated right   heart pressure      Probable superior mesenteric and left renal artery stenosis.      Borderline enlarged right inguinal lymph node measures approximately 2.8 x   1.7 cm. Few venous varicosities are also noted.      A metallic probable capsule camera device noted within the stomach.                              Electronically Signed by: OUMAR MCCLELLAN MD    Signed on: 7/16/2025 11:50 AM    Workstation ID: [id number]      Video Capsule Endoscopy   Final Result      Esophagogastroduodenoscopy (EGD) w Biopsy   Final Result      US VASC LOWER EXTREMITY VENOUS DUPLEX LEFT   Final Result   1.   No acute deep venous thrombosis in the visualized lower extremity   veins.   2.   Chronic nonocclusive thrombus in the popliteal vein.            Electronically Signed by: GAVIN RATLIFF MD    Signed on: 7/14/2025 9:58 PM    Workstation ID: [id number]      XR CHEST PA AND LATERAL 2 VIEWS   Final Result   1.   Mild central congestion. Minimal perihilar opacities. A loop recorder   device noted projecting over the left heart.            Electronically Signed by: OUMAR MCCLELLAN MD    Signed on: 7/14/2025 8:02 PM    Workstation ID: [id number]          Cultures  Microbiology Results       None            No results found for the last 90 days.        Assessment/Plan:    Jerome is a 87 year old male presenting with PMHx including DM CAD s/p PCI, HTN, CHF, HLD, A. Fib on Eliquis, PM, CKD stage 4, abdominal hernia, and DVT presenting to the ER with concerns of worsening SOB, lightheadedness, fatigue, and nonhealing LLE wound with edema for two weeks, found to have acute anemia.      Shortness of breath, resolved  - SOB  likely r/t anemia with concern for GIB, + guiac in ER   - CXR with mild central congestion. Minimal perihilar opacities -- given IV lasix x1 in ED  - EKG Ventricular-paced rhythm with occasional premature ventricular complexes  -   - Trop neg  - CTAP without acute abdominal pathology, enlarged IVC  - resume torsemide today  - V/Q scan no evidence of PE  - no CTA d/t renal function     GIB  Acute on chronic anemia   - melanic stools?  - positive guiac on rectal exam  - hgb 6.7, s/p pRBC -> 7.4 > 7.3 > 7.5 > 7.5  - hgb stable, no s/s bleeding  - Previously 10 in June   - cont trend hgb levels, repeat CBC in AM  - transfuse hgb > 7 as needed  - EGD with normal esophagus and duodenum and mild edematous, erythematous mucosa in the antrum - gastritis; biopsies charlie H. pylori  - CTAP without acute abdominal pathology, no hematoma   - video capsule study neg  - reluctant on getting colonoscopy d/t past experience  - workup so far neg, hgb stable, restart Eliquis and monitor   - GI on consult; appreciate recs     Iron Deficiency  - Anemia panel with iron 27, iron sat 7%, ferritin 23, TIBC 378, B12 846, Folate > 24.0, Transferrin 292   - S/p IV iron dose x2   - start po iron today     Combined systolic and diastolic Chronic Congestive Heart Failure   - lact echo (5/2025) EF 48%  - CXR with mild central congestion  -   - I&O, daily weights  - monitor lytes and creatinine  - K+/mag/phos replacement protocol  - currently does not appear decompensated, SOB may be 2/2 anemia   - GDMT  - hold home ARB for now in setting of soft BP, now improved  - continue home metoprolol   - cont monitor clinically and resume diuretics as tolerated, held for now in setting of GIB   - pending course may ultimately need cardiology to see as well, follows with Dr. Sherman outpatient      H/o Afib, DVT  - on Eliquis, restarted   - EKG: Ventricular-paced rhythm with occasional premature ventricular complexes  - US venous duplex LLE neg DVT,  chronic nonocclusive thrombus in the popliteal vein  - seen by hematology at last admission   - continue metoprolol     Leg wound  - chronic, ongoing for several months, does not appear infected  - lymphedema  - Likely in setting of venous insufficiency  - XR left ankle (05/30/25) no acute fx  - US venous duplex LLE neg DVT, chronic nonocclusive thrombus in the popliteal vein  - Wound RN on consult; see recs  - Nutrition on consult   - Per PCP notes pt planning to follow up with Dr Sinha outpatient      Gait imbalance  - no focal deficits  - rarely uses walker at home  - daughter reports pt more unsteady recently, appearing more fatigued   - PT/OT when more stable and closer to dc     NAYAN on CKD stage 4, improving   - baseline of ~1.6-2.1   - Cr 2.61 > 2.38 > 2.14 > 1.75 > 1.78  - Cr at baseline now  - resume torsemide, monitor   - cont trend levels  - avoid nephrotoxins  - follows with renal     Type II Diabetes Mellitus   - oral hypoglycemics discontinued  - ISS ordered as appropriate  - glucose accuchecks  - will modify insulin as needed  - hypoglycemia protocol     Essential Hypertension  - currently controlled, BP's normotensive now  - home meds initially on hold with concern for GIB and softer BP  -on metoprolol, resume torsemide   - monitor BP's & adjust meds as needed  - avoid hypotension    Hyperlipidemia  - cont statin     Goals of care  - palliative on consult        Dispo: Pending GI recs, restart Eliquis and monitor, HH on dc     Lives home alone on first floor. Independent with all ADLs. One daughter lives 2 hours away and other daughter [place] but does not drive.        DVT Prophylaxis:  Restart Eliquis      Diet: Iddsi 6 Soft & Bite-Sized (Dysphagia), Consistent Carb Moderate (45-75 Gm/Meal), Cardiac, Renal (2400mg Na+, 60meq K+, 1000mg P); Yes, Medical Nutrition Management by Rd (Registered Dietitian) Diet    CODE STATUS:   Code Status: Full Resuscitation    Physician Notification:   Consultants notified of patient via [employer].  Communication: with patient, nurse     I spent 50 MINS ON THIS PATIENTS CARE TODAY. This includes the following: Reviewed all vitals, medications, new orders, I/O, labs, micro, radiology, nurses notes, pertinent consultant notes which are reflected in assessment and plan.This does not include time spent on other items of care such as smoking cessation counseling, prolonged care time, and or advanced care planning if applicable.   (Level 1 PN: 25 min, Level 2 PN: 35 min, Level 3 PN: 50 min)     Primary Care Physician  Adriana Hernandez MD    Patient reviewed, examined, and seen with Vanita Proctor Hospitalist  7/18/2025 8:24 AM         3